# Patient Record
Sex: FEMALE | Race: WHITE | Employment: PART TIME | URBAN - METROPOLITAN AREA
[De-identification: names, ages, dates, MRNs, and addresses within clinical notes are randomized per-mention and may not be internally consistent; named-entity substitution may affect disease eponyms.]

---

## 2017-02-20 PROCEDURE — G0202 SCR MAMMO BI INCL CAD: HCPCS | Mod: TC | Performed by: RADIOLOGY

## 2017-02-20 PROCEDURE — 77063 BREAST TOMOSYNTHESIS BI: CPT | Mod: TC | Performed by: RADIOLOGY

## 2017-05-03 DIAGNOSIS — N91.2 ABSENCE OF MENSTRUATION: Primary | ICD-10-CM

## 2017-05-05 NOTE — TELEPHONE ENCOUNTER
romana      Last Written Prescription Date: 5/12/15  Last Fill Quantity: 60g,  # refills: 1   Last Office Visit with [facility], [facility] or [facility] prescribing provider: 10/14/16

## 2017-08-09 DIAGNOSIS — N91.2 ABSENCE OF MENSTRUATION: ICD-10-CM

## 2017-08-10 DIAGNOSIS — N91.2 ABSENCE OF MENSTRUATION: ICD-10-CM

## 2017-08-11 NOTE — TELEPHONE ENCOUNTER
COMPOUNDED NON-CONTROLLED SUBSTANCE (CMPD RX) - PHARMACY TO MIX COMPOUNDED MEDICATION   Last Written Prescription Date:  8/9/17  Last Fill Quantity: 90 lozenge,   # refills: 0  Last Office Visit with [facility] or [facility] prescribing provider: 10/14/16  Future Office visit:       Routing refill request to provider for review/approval because:  Drug not on the [facility], [facility] or [facility] refill protocol or controlled substance

## 2018-01-31 DIAGNOSIS — N91.2 ABSENCE OF MENSTRUATION: ICD-10-CM

## 2018-01-31 NOTE — TELEPHONE ENCOUNTER
Visit overdue  Needs appt by end of February - also needs mammogram  Rx signed    Dary PARNELL-[facility]  [phone]

## 2018-01-31 NOTE — TELEPHONE ENCOUNTER
COMPOUNDED NON-CONTROLLED SUBSTANCE (CMPD RX) - PHARMACY TO MIX COMPOUNDED MEDICATION     Last Written Prescription Date:  8/11/17  Last Fill Quantity: 90,   # refills: 0  Last Office Visit: 10/14/16  Future Office visit:

## 2018-02-23 ENCOUNTER — RADIANT APPOINTMENT (OUTPATIENT)
Dept: GENERAL RADIOLOGY | Facility: OTHER | Age: 53
End: 2018-02-23
Attending: NURSE PRACTITIONER
Payer: COMMERCIAL

## 2018-02-23 ENCOUNTER — OFFICE VISIT (OUTPATIENT)
Dept: FAMILY MEDICINE | Facility: OTHER | Age: 53
End: 2018-02-23
Attending: NURSE PRACTITIONER
Payer: COMMERCIAL

## 2018-02-23 VITALS
TEMPERATURE: 99.1 F | WEIGHT: 162 LBS | DIASTOLIC BLOOD PRESSURE: 80 MMHG | HEIGHT: 67 IN | BODY MASS INDEX: 25.43 KG/M2 | HEART RATE: 96 BPM | SYSTOLIC BLOOD PRESSURE: 130 MMHG | OXYGEN SATURATION: 98 % | RESPIRATION RATE: 16 BRPM

## 2018-02-23 DIAGNOSIS — R53.83 FATIGUE, UNSPECIFIED TYPE: ICD-10-CM

## 2018-02-23 DIAGNOSIS — J01.00 ACUTE NON-RECURRENT MAXILLARY SINUSITIS: ICD-10-CM

## 2018-02-23 DIAGNOSIS — J20.9 ACUTE BRONCHITIS, UNSPECIFIED ORGANISM: ICD-10-CM

## 2018-02-23 DIAGNOSIS — R50.9 FEVER, UNSPECIFIED FEVER CAUSE: ICD-10-CM

## 2018-02-23 DIAGNOSIS — Z12.31 ENCOUNTER FOR SCREENING MAMMOGRAM FOR BREAST CANCER: ICD-10-CM

## 2018-02-23 DIAGNOSIS — E78.5 HYPERLIPIDEMIA LDL GOAL <100: Primary | ICD-10-CM

## 2018-02-23 DIAGNOSIS — Z12.11 SCREENING FOR COLON CANCER: ICD-10-CM

## 2018-02-23 DIAGNOSIS — G43.009 MIGRAINE WITHOUT AURA AND WITHOUT STATUS MIGRAINOSUS, NOT INTRACTABLE: ICD-10-CM

## 2018-02-23 LAB
ANION GAP SERPL CALCULATED.3IONS-SCNC: 9 MMOL/L (ref 3–14)
BASOPHILS # BLD AUTO: 0 10E9/L (ref 0–0.2)
BASOPHILS NFR BLD AUTO: 0.7 %
BUN SERPL-MCNC: 10 MG/DL (ref 7–30)
CALCIUM SERPL-MCNC: 9.2 MG/DL (ref 8.5–10.1)
CHLORIDE SERPL-SCNC: 100 MMOL/L (ref 94–109)
CHOLEST SERPL-MCNC: 209 MG/DL
CO2 SERPL-SCNC: 27 MMOL/L (ref 20–32)
CREAT SERPL-MCNC: 0.94 MG/DL (ref 0.52–1.04)
DIFFERENTIAL METHOD BLD: NORMAL
EOSINOPHIL # BLD AUTO: 0.3 10E9/L (ref 0–0.7)
EOSINOPHIL NFR BLD AUTO: 4.5 %
ERYTHROCYTE [DISTWIDTH] IN BLOOD BY AUTOMATED COUNT: 12.9 % (ref 10–15)
FLUAV+FLUBV AG SPEC QL: NEGATIVE
FLUAV+FLUBV AG SPEC QL: NEGATIVE
GFR SERPL CREATININE-BSD FRML MDRD: 63 ML/MIN/1.7M2
GLUCOSE SERPL-MCNC: 92 MG/DL (ref 70–99)
HCT VFR BLD AUTO: 42.9 % (ref 35–47)
HDLC SERPL-MCNC: 60 MG/DL
HGB BLD-MCNC: 14.7 G/DL (ref 11.7–15.7)
LDLC SERPL CALC-MCNC: 125 MG/DL
LYMPHOCYTES # BLD AUTO: 1.6 10E9/L (ref 0.8–5.3)
LYMPHOCYTES NFR BLD AUTO: 27.9 %
MCH RBC QN AUTO: 30.2 PG (ref 26.5–33)
MCHC RBC AUTO-ENTMCNC: 34.3 G/DL (ref 31.5–36.5)
MCV RBC AUTO: 88 FL (ref 78–100)
MONOCYTES # BLD AUTO: 0.8 10E9/L (ref 0–1.3)
MONOCYTES NFR BLD AUTO: 14.2 %
NEUTROPHILS # BLD AUTO: 3.1 10E9/L (ref 1.6–8.3)
NEUTROPHILS NFR BLD AUTO: 52.7 %
NONHDLC SERPL-MCNC: 149 MG/DL
PLATELET # BLD AUTO: 308 10E9/L (ref 150–450)
POTASSIUM SERPL-SCNC: 3.8 MMOL/L (ref 3.4–5.3)
RBC # BLD AUTO: 4.87 10E12/L (ref 3.8–5.2)
SODIUM SERPL-SCNC: 136 MMOL/L (ref 133–144)
SPECIMEN SOURCE: NORMAL
TRIGL SERPL-MCNC: 120 MG/DL
TSH SERPL DL<=0.005 MIU/L-ACNC: 2.38 MU/L (ref 0.4–4)
WBC # BLD AUTO: 5.8 10E9/L (ref 4–11)

## 2018-02-23 PROCEDURE — 84443 ASSAY THYROID STIM HORMONE: CPT | Performed by: NURSE PRACTITIONER

## 2018-02-23 PROCEDURE — 82306 VITAMIN D 25 HYDROXY: CPT | Mod: 90 | Performed by: NURSE PRACTITIONER

## 2018-02-23 PROCEDURE — 99214 OFFICE O/P EST MOD 30 MIN: CPT | Performed by: NURSE PRACTITIONER

## 2018-02-23 PROCEDURE — 71046 X-RAY EXAM CHEST 2 VIEWS: CPT | Mod: TC

## 2018-02-23 PROCEDURE — 87804 INFLUENZA ASSAY W/OPTIC: CPT | Mod: 59 | Performed by: NURSE PRACTITIONER

## 2018-02-23 PROCEDURE — 85025 COMPLETE CBC W/AUTO DIFF WBC: CPT | Performed by: NURSE PRACTITIONER

## 2018-02-23 PROCEDURE — 99000 SPECIMEN HANDLING OFFICE-LAB: CPT | Performed by: NURSE PRACTITIONER

## 2018-02-23 PROCEDURE — 80048 BASIC METABOLIC PNL TOTAL CA: CPT | Performed by: NURSE PRACTITIONER

## 2018-02-23 PROCEDURE — 80061 LIPID PANEL: CPT | Performed by: NURSE PRACTITIONER

## 2018-02-23 PROCEDURE — 36415 COLL VENOUS BLD VENIPUNCTURE: CPT | Performed by: NURSE PRACTITIONER

## 2018-02-23 RX ORDER — AZITHROMYCIN 250 MG/1
TABLET, FILM COATED ORAL
Qty: 11 TABLET | Refills: 0 | Status: SHIPPED | OUTPATIENT
Start: 2018-02-23 | End: 2019-02-19

## 2018-02-23 RX ORDER — RIZATRIPTAN BENZOATE 5 MG/1
5-10 TABLET ORAL
Qty: 18 TABLET | Refills: 3 | Status: SHIPPED | OUTPATIENT
Start: 2018-02-23 | End: 2019-02-20

## 2018-02-23 ASSESSMENT — PAIN SCALES - GENERAL: PAINLEVEL: NO PAIN (0)

## 2018-02-23 NOTE — PATIENT INSTRUCTIONS
1. Fever, unspecified fever cause  - Influenza A/B antigen    2. Acute bronchitis, unspecified organism  - Influenza A/B antigen  - XR CHEST 2 VW (Clinic Performed); Future  - azithromycin (ZITHROMAX) 250 MG tablet; Two tablets first day, then one tablet daily for 9 days.  Dispense: 11 tablet; Refill: 0    3. Acute non-recurrent maxillary sinusitis  - azithromycin (ZITHROMAX) 250 MG tablet; Two tablets first day, then one tablet daily for 9 days.  Dispense: 11 tablet; Refill: 0      Fluids  Rest  Humidity at home - add bacteriostatic solution to humidifier  OTC Mucinex liquid cough and cold  Add Zicam or other zinc daily until you feel better  Please go to the ER or UC if your symptoms worsen   Please return to clinic if unimproved  Ibuprofen for temp / body aches      4. Fatigue, unspecified type  - Vitamin D Deficiency  - CBC with platelets differential  - Cholecalciferol (VITAMIN D-3) 5000 UNITS TABS; Take 1 capsule by mouth daily  Dispense: 90 tablet; Refill: 11    5. Hyperlipidemia LDL goal <100  - Fish oil 3 per day  - Lipid Profile  - Basic metabolic panel  - TSH with free T4 reflex    6. Migraine without aura and without status migrainosus, not intractable  - magnesium 200 mg daily  - rizatriptan (MAXALT) 5 MG tablet; Take 1-2 tablets (5-10 mg) by mouth at onset of headache for migraine May repeat in 2 hours. Max 6 tablets/24 hours.  Dispense: 18 tablet; Refill: 3    7. Encounter for screening mammogram for breast cancer  - MA Screen Bilateral w/Venancio; Future    8. Screening for colon cancer  - GENERAL SURG ADULT REFERRAL      Baby aspirin daily  Multiple vitamin daily        Dary Goddard NP  [facility]

## 2018-02-23 NOTE — NURSING NOTE
"Chief Complaint   Patient presents with     Hyperlipidemia       Initial /86 (BP Location: Right arm, Patient Position: Sitting, Cuff Size: Adult Regular)  Pulse 96  Temp 99.1  F (37.3  C) (Tympanic)  Resp 16  Ht 5' 7\" (1.702 m)  Wt 162 lb (73.5 kg)  SpO2 98%  BMI 25.37 kg/m2 Estimated body mass index is 25.37 kg/(m^2) as calculated from the following:    Height as of this encounter: 5' 7\" (1.702 m).    Weight as of this encounter: 162 lb (73.5 kg).  Medication Reconciliation: complete   Blaire Dixon MA  "

## 2018-02-23 NOTE — PROGRESS NOTES
SUBJECTIVE:                                                    Kristina Michael is a 52 year old female who presents to clinic today for the following health issues:        Hyperlipidemia Follow-Up    Rate your low fat/cholesterol diet?: fair    Taking statin?  No    Other lipid medications/supplements?:  None        BP Readings from Last 2 Encounters:   02/23/18 130/80   10/14/16 128/88     LDL Cholesterol Calculated (mg/dL)   Date Value   10/14/2016 147 (H)         Migraine Follow-Up    Headaches symptoms:  Stable     Frequency: variable     Duration of headaches: variable    Able to do normal daily activities/work with migraines: Yes    Rescue/Relief medication:Maxalt              Effectiveness: total relief    Preventative medication: None    Neurologic complications: No new stroke-like symptoms, loss of vision or speech, numbness or weakness    In the past 4 weeks, how often have you gone to Urgent Care or the emergency room because of your headaches?  0        RESPIRATORY SYMPTOMS    Duration: Since Monday    Description  nasal congestion, sore throat, facial pain/pressure, cough, fever, chills, ear pain bilateral, headache and fatigue/malaise    Severity: mild    Accompanying signs and symptoms: None    History (predisposing factors):  none    Precipitating or alleviating factors: None    Therapies tried and outcome:  rest and fluids oral decongestant acetaminophen            Amount of exercise or physical activity: 4-5 days/week for an average of 30-45 minutes    Problems taking medications regularly: No    Medication side effects: none    Diet: low salt        Problem list and histories reviewed & adjusted, as indicated.  Additional history: as documented    Patient Active Problem List   Diagnosis     Primary insomnia     Surgical menopause     ACP (advance care planning)     H/O cold sores     Hyperlipidemia LDL goal <100     Migraine without aura and without status migrainosus, not intractable     Past  Surgical History:   Procedure Laterality Date     BREAST SURGERY      breast reduction     GYN SURGERY  2000    total hysterectomy     GYN SURGERY      hysterscopy     GYN SURGERY      endometerial tiissue removed x's 2     GYN SURGERY      D&C     HC TOOTH EXTRACTION W/FORCEP         Social History   Substance Use Topics     Smoking status: Never Smoker     Smokeless tobacco: Never Used     Alcohol use Yes     Family History   Problem Relation Age of Onset     Cancer - colorectal Paternal Grandfather      Alzheimer Disease Paternal Grandfather          Current Outpatient Prescriptions   Medication Sig Dispense Refill     COMPOUNDED NON-CONTROLLED SUBSTANCE (CMPD RX) - PHARMACY TO MIX COMPOUNDED MEDICATION DISSOLVE 1 SABAS BETWEEN CHEEK AND GUM EVERY NIGHT AT BEDTIME 90 lozenge 0     LORazepam (ATIVAN) 1 MG tablet TAKE 1 TABLET BY MOUTH TWICE DAILY AS NEEDED FOR ANXIETY. TAKE 1 TABLET BY MOUTH AT BEDTIME 60 tablet 0     UNABLE TO FIND MEDICATION NAME: Bi-Est(80/20) Prog/Test 0.75/40/0.25mg Sabas   1 every HS 90 each 3     valACYclovir (VALTREX) 500 MG tablet Take 1 tablet (500 mg) by mouth 2 times daily 30 tablet 3     multivitamin, therapeutic (THERA-VIT) TABS Take 1 tablet by mouth daily       desonide (DESOWEN) 0.05 % cream Apply topically as needed Apply to eye lids and ears prn (Patient not taking: Reported on 2/23/2018) 60 g 1     Allergies   Allergen Reactions     Penicillins Hives     Recent Labs   Lab Test  10/14/16   0922   LDL  147*   HDL  69   TRIG  119   CR  0.87   GFRESTIMATED  70   GFRESTBLACK  84   POTASSIUM  4.0   TSH  1.97      BP Readings from Last 3 Encounters:   02/23/18 130/80   10/14/16 128/88   04/04/16 136/82    Wt Readings from Last 3 Encounters:   02/23/18 162 lb (73.5 kg)   10/14/16 166 lb (75.3 kg)   04/04/16 161 lb (73 kg)               Labs reviewed in EPIC    ROS:  Constitutional, HEENT, cardiovascular, pulmonary, gi and gu systems are negative, except as otherwise  "noted.    OBJECTIVE:     /80 (BP Location: Right arm, Patient Position: Sitting, Cuff Size: Adult Regular)  Pulse 96  Temp 99.1  F (37.3  C) (Tympanic)  Resp 16  Ht 5' 7\" (1.702 m)  Wt 162 lb (73.5 kg)  SpO2 98%  BMI 25.37 kg/m2  Body mass index is 25.37 kg/(m^2).       GENERAL: healthy, alert and no distress  EYES: Eyes grossly normal to inspection, PERRL and conjunctivae and sclerae normal  HENT: both ears: erythematous and sinuses: maxillary, frontal tenderness on both sides - thick yellow PND noted  NECK: no adenopathy, no asymmetry, masses, or scars and thyroid normal to palpation  RESP: deep loose cough  CV: regular rate and rhythm, normal S1 S2, no S3 or S4, no murmur, click or rub, no peripheral edema and peripheral pulses strong  ABDOMEN: soft, nontender, no hepatosplenomegaly, no masses and bowel sounds normal  MS: no gross musculoskeletal defects noted, no edema  SKIN: no suspicious lesions or rashes  PSYCH: mentation appears normal, affect normal/bright        PROCEDURE: XR CHEST 2 VW 2/23/2018 9:47 AM    HISTORY: bronchitis, deep cough; Acute bronchitis, unspecified  organism    COMPARISONS: None.    TECHNIQUE: 2 views.    FINDINGS: Heart and pulmonary vasculature are normal. Lungs are clear  and no pleural effusion is seen. There is mild degenerative change  thoracic spine.                Impression             IMPRESSION: No acute disease.    BETY HU MD                     ASSESSMENT/PLAN:       Migraine: controlled   Plan:  No changes in the patient's current treatment plan  RN migraine education      1. Fever, unspecified fever cause  - Influenza A/B antigen    2. Acute bronchitis, unspecified organism  - Influenza A/B antigen  - XR CHEST 2 VW (Clinic Performed); Future  - azithromycin (ZITHROMAX) 250 MG tablet; Two tablets first day, then one tablet daily for 9 days.  Dispense: 11 tablet; Refill: 0    3. Acute non-recurrent maxillary sinusitis  - azithromycin (ZITHROMAX) 250 " MG tablet; Two tablets first day, then one tablet daily for 9 days.  Dispense: 11 tablet; Refill: 0      Fluids  Rest  Humidity at home - add bacteriostatic solution to humidifier  OTC Mucinex liquid cough and cold  Add Zicam or other zinc daily until you feel better  Please go to the ER or UC if your symptoms worsen   Please return to clinic if unimproved  Ibuprofen for temp / body aches      4. Fatigue, unspecified type  - Vitamin D Deficiency  - CBC with platelets differential  - Cholecalciferol (VITAMIN D-3) 5000 UNITS TABS; Take 1 capsule by mouth daily  Dispense: 90 tablet; Refill: 11    5. Hyperlipidemia LDL goal <100  - Fish oil 3 per day  - Lipid Profile  - Basic metabolic panel  - TSH with free T4 reflex    6. Migraine without aura and without status migrainosus, not intractable  - magnesium 200 mg daily  - rizatriptan (MAXALT) 5 MG tablet; Take 1-2 tablets (5-10 mg) by mouth at onset of headache for migraine May repeat in 2 hours. Max 6 tablets/24 hours.  Dispense: 18 tablet; Refill: 3    7. Encounter for screening mammogram for breast cancer  - MA Screen Bilateral w/Venancio; Future    8. Screening for colon cancer  - GENERAL SURG ADULT REFERRAL      Baby aspirin daily  Multiple vitamin daily        Dary Goddard NP  [facility]

## 2018-02-23 NOTE — LETTER
My Migraine Action Plan      Date: 2/23/2018     My Name: Kristina Michael   YOB: 1965  My Pharmacy:    [facility] - [place], [place]  [facility] [id number] [place] [address] [address] [place]  [address] [facility] [address]  [facility] [place] - [address] [facility] [place]. [facility] [place] [facility] [place]       My (Preventative) Control Medicine: None        My Rescue Medicine: Maxalt   My Doctor: Dary Goddard     My Clinic: [facility]  [address]  [facility] [address]  [phone]        GREEN ZONE = Good Control    My headache plan is working.   I can do what I need to do.           I WILL:     ? Keep managing my triggers.  ? Write in my migraine diary each time I have a headache.  ? Keep taking my preventive (controller) medicine daily.  ? Take my relief and rescue medicine as needed.             YELLOW ZONE = Not Enough Control    My headache plan isn t always working.   My headaches keep me from doing   some of the things I need to do.       I WILL:     ? Set goals to control my triggers and act on them.  ? Write in my migraine diary each time I have a headache and review it for                      patterns or new triggers.  ? Keep taking my preventive (controller) medicine daily.  ? Take my relief and rescue medicine as needed.  ? Call my doctor or clinic at if I stay in the Yellow Zone.             RED ZONE = Poor or No Control    My headache plan has  failed. I can t do anything  when I have one. My  medicines aren t working.           I WILL:   ? Set goals to control my triggers and act on them.  ? Write in my migraine diary each time I have a headache and review it for                      patterns or new triggers.  ? Keep taking my preventive (controller) medicine daily.  ? Take my relief and rescue medicine as needed.  ? Call my doctor or clinic or go to urgent care or an ER if I m having the worst                  headache  of my life.  ? Call my doctor or clinic or go to urgent care or an ER if my medicine doesn t work.  ? Let my doctor or clinic know within 2 weeks if I have gone to an urgent care or             emergency department.          Provider specific instructions:  No

## 2018-02-23 NOTE — MR AVS SNAPSHOT
After Visit Summary   2/23/2018    Kristina Michael    MRN: [id number]           Patient Information     Date Of Birth          1965        Visit Information        Provider Department      2/23/2018 9:00 AM Dary Goddard NP [facility]        Today's Diagnoses     Hyperlipidemia LDL goal <100    -  1    Fever, unspecified fever cause        Acute bronchitis, unspecified organism        Acute non-recurrent maxillary sinusitis        Fatigue, unspecified type        Migraine without aura and without status migrainosus, not intractable        Encounter for screening mammogram for breast cancer        Screening for colon cancer          Care Instructions      1. Fever, unspecified fever cause  - Influenza A/B antigen    2. Acute bronchitis, unspecified organism  - Influenza A/B antigen  - XR CHEST 2 VW (Clinic Performed); Future  - azithromycin (ZITHROMAX) 250 MG tablet; Two tablets first day, then one tablet daily for 9 days.  Dispense: 11 tablet; Refill: 0    3. Acute non-recurrent maxillary sinusitis  - azithromycin (ZITHROMAX) 250 MG tablet; Two tablets first day, then one tablet daily for 9 days.  Dispense: 11 tablet; Refill: 0      Fluids  Rest  Humidity at home - add bacteriostatic solution to humidifier  OTC Mucinex liquid cough and cold  Add Zicam or other zinc daily until you feel better  Please go to the ER or UC if your symptoms worsen   Please return to clinic if unimproved  Ibuprofen for temp / body aches      4. Fatigue, unspecified type  - Vitamin D Deficiency  - CBC with platelets differential  - Cholecalciferol (VITAMIN D-3) 5000 UNITS TABS; Take 1 capsule by mouth daily  Dispense: 90 tablet; Refill: 11    5. Hyperlipidemia LDL goal <100  - Fish oil 3 per day  - Lipid Profile  - Basic metabolic panel  - TSH with free T4 reflex    6. Migraine without aura and without status migrainosus, not intractable  - magnesium 200 mg daily  - rizatriptan (MAXALT) 5 MG tablet; Take 1-2  tablets (5-10 mg) by mouth at onset of headache for migraine May repeat in 2 hours. Max 6 tablets/24 hours.  Dispense: 18 tablet; Refill: 3    7. Encounter for screening mammogram for breast cancer  - MA Screen Bilateral w/Venancio; Future    8. Screening for colon cancer  - GENERAL SURG ADULT REFERRAL      Baby aspirin daily  Multiple vitamin daily        Dary Goddard NP  [facility]              Follow-ups after your visit        Additional Services     GENERAL SURG ADULT REFERRAL       Your provider has referred you to: General  Surgery  - colon consult - FH positive    Please be aware that coverage of these services is subject to the terms and limitations of your health insurance plan.  Call member services at your health plan with any benefit or coverage questions.      Please bring the following with you to your appointment:    (1) Any X-Rays, CTs or MRIs which have been performed.  Contact the facility where they were done to arrange for  prior to your scheduled appointment.   (2) List of current medications   (3) This referral request   (4) Any documents/labs given to you for this referral                  Future tests that were ordered for you today     Open Future Orders        Priority Expected Expires Ordered    MA Screen Bilateral w/Venancio Routine  2/23/2019 2/23/2018            Who to contact     If you have questions or need follow up information about today's clinic visit or your schedule please contact [facility] directly at [phone].  Normal or non-critical lab and imaging results will be communicated to you by MyChart, letter or phone within 4 business days after the clinic has received the results. If you do not hear from us within 7 days, please contact the clinic through MyChart or phone. If you have a critical or abnormal lab result, we will notify you by phone as soon as possible.  Submit refill requests through [employer] or call your pharmacy and they will  "forward the refill request to us. Please allow 3 business days for your refill to be completed.          Additional Information About Your Visit        [employer] Information     [employer] gives you secure access to your electronic health record. If you see a primary care provider, you can also send messages to your care team and make appointments. If you have questions, please call your primary care clinic.  If you do not have a primary care provider, please call [phone] and they will assist you.        Care EveryWhere ID     This is your Care EveryWhere ID. This could be used by other organizations to access your [facility] medical records  [id number]        Your Vitals Were     Pulse Temperature Respirations Height Pulse Oximetry BMI (Body Mass Index)    96 99.1  F (37.3  C) (Tympanic) 16 5' 7\" (1.702 m) 98% 25.37 kg/m2       Blood Pressure from Last 3 Encounters:   02/23/18 130/80   10/14/16 128/88   04/04/16 136/82    Weight from Last 3 Encounters:   02/23/18 162 lb (73.5 kg)   10/14/16 166 lb (75.3 kg)   04/04/16 161 lb (73 kg)              We Performed the Following     Basic metabolic panel     CBC with platelets differential     GENERAL SURG ADULT REFERRAL     Influenza A/B antigen     Lipid Profile     TSH with free T4 reflex     Vitamin D Deficiency          Today's Medication Changes          These changes are accurate as of 2/23/18  9:54 AM.  If you have any questions, ask your nurse or doctor.               Start taking these medicines.        Dose/Directions    azithromycin 250 MG tablet   Commonly known as:  ZITHROMAX   Used for:  Acute bronchitis, unspecified organism, Acute non-recurrent maxillary sinusitis   Started by:  Dary Goddard NP        Two tablets first day, then one tablet daily for 9 days.   Quantity:  11 tablet   Refills:  0       rizatriptan 5 MG tablet   Commonly known as:  MAXALT   Used for:  Migraine without aura and without status migrainosus, not intractable   Started by:  Nitza" VIVEK Foote        Dose:  5-10 mg   Take 1-2 tablets (5-10 mg) by mouth at onset of headache for migraine May repeat in 2 hours. Max 6 tablets/24 hours.   Quantity:  18 tablet   Refills:  3       Vitamin D-3 5000 UNITS Tabs   Used for:  Fatigue, unspecified type   Started by:  Dary Goddard NP        Dose:  1 capsule   Take 1 capsule by mouth daily   Quantity:  90 tablet   Refills:  11            Where to get your medicines      These medications were sent to [id number] [place] [address]  AT [facility] [address] [place] [address]     Phone:  [phone]     azithromycin 250 MG tablet    rizatriptan 5 MG tablet         Some of these will need a paper prescription and others can be bought over the counter.  Ask your nurse if you have questions.     You don't need a prescription for these medications     Vitamin D-3 5000 UNITS Tabs                Primary Care Provider Office Phone # Fax #    Dary Goddard [phone] [address] [place]  [facility] [address]        Equal Access to Services     SEBASTIAN CROSS AH: Hadii tiffanie ku hadasho Soomaali, waaxda luqadaha, qaybta kaalmada adedorotayadomenic, alexandro loo. So [facility] [phone].    ATENCIÓN: Si habla español, tiene a lopez disposición servicios gratuitos de asistencia lingüística. [facility] [phone].    We comply with applicable federal civil rights laws and [place] laws. We do not discriminate on the basis of race, color, national origin, age, disability, sex, sexual orientation, or gender identity.            Thank you!     Thank you for choosing [facility]  for your care. Our goal is always to provide you with excellent care. Hearing back from our patients is one way we can continue to improve our services. Please take a few minutes to complete the written survey that you may receive in the mail after your visit with us. Thank you!              Your Updated Medication List - Protect others around you: Learn how to safely use, store and throw away your medicines at www.disposemymeds.org.          This list is accurate as of 2/23/18  9:54 AM.  Always use your most recent med list.                   Brand Name Dispense Instructions for use Diagnosis    azithromycin 250 MG tablet    ZITHROMAX    11 tablet    Two tablets first day, then one tablet daily for 9 days.    Acute bronchitis, unspecified organism, Acute non-recurrent maxillary sinusitis       COMPOUNDED NON-CONTROLLED SUBSTANCE - PHARMACY TO MIX COMPOUNDED MEDICATION    CMPD RX    90 lozenge    DISSOLVE 1 SABAS BETWEEN CHEEK AND GUM EVERY NIGHT AT BEDTIME    Absence of menstruation       desonide 0.05 % cream    DESOWEN    60 g    Apply topically as needed Apply to eye lids and ears prn    Eczema       LORazepam 1 MG tablet    ATIVAN    60 tablet    TAKE 1 TABLET BY MOUTH TWICE DAILY AS NEEDED FOR ANXIETY. TAKE 1 TABLET BY MOUTH AT BEDTIME    Anxiety       multivitamin, therapeutic Tabs tablet      Take 1 tablet by mouth daily        rizatriptan 5 MG tablet    MAXALT    18 tablet    Take 1-2 tablets (5-10 mg) by mouth at onset of headache for migraine May repeat in 2 hours. Max 6 tablets/24 hours.    Migraine without aura and without status migrainosus, not intractable       UNABLE TO FIND     90 each    MEDICATION NAME: Bi-Est(80/20) Prog/Test 0.75/40/0.25mg Sabas   1 every HS    Absence of menstruation       valACYclovir 500 MG tablet    VALTREX    30 tablet    Take 1 tablet (500 mg) by mouth 2 times daily    H/O cold sores       Vitamin D-3 5000 UNITS Tabs     90 tablet    Take 1 capsule by mouth daily    Fatigue, unspecified type

## 2018-02-23 NOTE — PROGRESS NOTES
Labs look good  Lipids are improved  Fish oil 3 per day  D level pending    Dary [facility]LL[facility]ra [facility]  [phone]

## 2018-02-24 LAB — DEPRECATED CALCIDIOL+CALCIFEROL SERPL-MC: 111 UG/L (ref 20–75)

## 2018-02-24 ASSESSMENT — PATIENT HEALTH QUESTIONNAIRE - PHQ9: SUM OF ALL RESPONSES TO PHQ QUESTIONS 1-9: 0

## 2018-02-26 NOTE — PROGRESS NOTES
Hold vitamin D3   Resume D3 5000 U in the fall    Dary PARNELLVA [facility]  [phone]

## 2018-03-12 DIAGNOSIS — F41.9 ANXIETY: ICD-10-CM

## 2018-03-13 RX ORDER — LORAZEPAM 1 MG/1
TABLET ORAL
Qty: 60 TABLET | Refills: 0 | Status: SHIPPED | OUTPATIENT
Start: 2018-03-13 | End: 2018-04-18

## 2018-03-14 ENCOUNTER — RADIANT APPOINTMENT (OUTPATIENT)
Dept: MAMMOGRAPHY | Facility: OTHER | Age: 53
End: 2018-03-14
Attending: NURSE PRACTITIONER
Payer: COMMERCIAL

## 2018-03-14 DIAGNOSIS — Z12.31 ENCOUNTER FOR SCREENING MAMMOGRAM FOR BREAST CANCER: ICD-10-CM

## 2018-03-14 PROCEDURE — 77063 BREAST TOMOSYNTHESIS BI: CPT | Mod: TC

## 2018-03-14 PROCEDURE — 77067 SCR MAMMO BI INCL CAD: CPT | Mod: TC

## 2018-04-18 DIAGNOSIS — F41.9 ANXIETY: ICD-10-CM

## 2018-04-18 RX ORDER — LORAZEPAM 1 MG/1
TABLET ORAL
Qty: 60 TABLET | Refills: 0 | Status: SHIPPED | OUTPATIENT
Start: 2018-04-18 | End: 2018-05-30

## 2018-04-18 NOTE — TELEPHONE ENCOUNTER
Ativan      Last Written Prescription Date:  3/13/18  Last Fill Quantity: 60,   # refills: 0  Last Office Visit: 2/23/18 with Dary Goddard CNP  Future Office visit:       Routing refill request to provider for review/approval because:  Drug not on the [facility], [facility] or [facility] refill protocol or controlled substance

## 2018-05-30 DIAGNOSIS — F41.9 ANXIETY: ICD-10-CM

## 2018-06-01 RX ORDER — LORAZEPAM 1 MG/1
TABLET ORAL
Qty: 60 TABLET | Refills: 0 | Status: SHIPPED | OUTPATIENT
Start: 2018-06-01 | End: 2018-07-26

## 2018-07-26 DIAGNOSIS — F41.9 ANXIETY: ICD-10-CM

## 2018-07-26 RX ORDER — LORAZEPAM 1 MG/1
TABLET ORAL
Qty: 60 TABLET | Refills: 0 | Status: SHIPPED | OUTPATIENT
Start: 2018-07-26 | End: 2019-02-20

## 2018-07-26 NOTE — TELEPHONE ENCOUNTER
lorazepam      Last Written Prescription Date:  6/1/18  Last Fill Quantity: 60,   # refills: 0  Last Office Visit: 2/23/18  Future Office visit:       Routing refill request to provider for review/approval because:  Drug not on the [facility] or [facility] refill protocol or controlled substance

## 2018-08-01 DIAGNOSIS — N91.2 ABSENCE OF MENSTRUATION: ICD-10-CM

## 2018-09-12 DIAGNOSIS — F41.9 ANXIETY: Primary | ICD-10-CM

## 2018-09-13 RX ORDER — LORAZEPAM 0.5 MG/1
TABLET ORAL
Qty: 120 TABLET | Refills: 0 | Status: SHIPPED | OUTPATIENT
Start: 2018-09-13 | End: 2018-10-24

## 2018-10-24 DIAGNOSIS — F41.9 ANXIETY: ICD-10-CM

## 2018-10-24 RX ORDER — LORAZEPAM 0.5 MG/1
TABLET ORAL
Qty: 120 TABLET | Refills: 0 | Status: SHIPPED | OUTPATIENT
Start: 2018-10-24 | End: 2018-12-07

## 2018-12-07 DIAGNOSIS — F41.9 ANXIETY: ICD-10-CM

## 2018-12-07 RX ORDER — LORAZEPAM 0.5 MG/1
TABLET ORAL
Qty: 120 TABLET | Refills: 0 | Status: SHIPPED | OUTPATIENT
Start: 2018-12-07 | End: 2019-01-21

## 2018-12-07 NOTE — TELEPHONE ENCOUNTER
Lorazepam      Last Written Prescription Date:  10/24/18  Last Fill Quantity: 120,   # refills: 0  Last Office Visit: 2/23/18

## 2019-01-21 DIAGNOSIS — F41.9 ANXIETY: ICD-10-CM

## 2019-01-21 RX ORDER — LORAZEPAM 0.5 MG/1
TABLET ORAL
Qty: 120 TABLET | Refills: 0 | Status: SHIPPED | OUTPATIENT
Start: 2019-01-21 | End: 2019-02-20

## 2019-02-19 PROBLEM — F41.9 ANXIETY: Status: ACTIVE | Noted: 2019-02-19

## 2019-02-19 NOTE — PROGRESS NOTES
SUBJECTIVE:   Kristina Michael is a 53 year old female who presents to clinic today for the following health issues:            Hyperlipidemia Follow-Up    Rate your low fat/cholesterol diet?: fair    Taking statin?  No    Other lipid medications/supplements?:  none      Migraine Follow-Up    Headaches symptoms:  Stable     Frequency: 1-2 times per week     Duration of headaches: 3 days without medication    Able to do normal daily activities/work with migraines: Yes    Rescue/Relief medication:Maxalt              Effectiveness: moderate relief    Preventative medication: None    Neurologic complications: No new stroke-like symptoms, loss of vision or speech, numbness or weakness    In the past 4 weeks, how often have you gone to Urgent Care or the emergency room because of your headaches?  0         Anxiety Follow-Up    Status since last visit: stable    Other associated symptoms:None    Complicating factors:   Significant life event: No   Current substance abuse: None  Depression symptoms: No    SARAVANAN-7 SCORE 10/14/2016 2/20/2019   Total Score 4 0       PHQ-9 SCORE 10/14/2016 2/23/2018 2/20/2019   PHQ-9 Total Score - - -   PHQ-9 Total Score 2 0 0          Insomnia  Duration of complaint: Ongoing, stable          Amount of exercise or physical activity: 4-5 days/week for an average of 45-60 minutes    Problems taking medications regularly: No    Medication side effects: none    Diet: low fat/cholesterol        Problem list and histories reviewed & adjusted, as indicated.  Additional history: as documented        Patient Active Problem List   Diagnosis     Primary insomnia     Surgical menopause     ACP (advance care planning)     H/O cold sores     Hyperlipidemia LDL goal <100     Migraine without aura and without status migrainosus, not intractable     Anxiety     Past Surgical History:   Procedure Laterality Date     BREAST SURGERY      breast reduction     GYN SURGERY      hysterscopy     GYN SURGERY       endometerial tiissue removed x's 2     GYN SURGERY      D&C     HC TOOTH EXTRACTION W/FORCEP       HYSTERECTOMY TOTAL ABDOMINAL N/A 01/01/2000    total hysterectomy       Social History     Tobacco Use     Smoking status: Never Smoker     Smokeless tobacco: Never Used   Substance Use Topics     Alcohol use: Yes     Family History   Problem Relation Age of Onset     Cancer - colorectal Paternal Grandfather      Alzheimer Disease Paternal Grandfather            Current Outpatient Medications   Medication Sig Dispense Refill     Cholecalciferol (VITAMIN D-3) 5000 UNITS TABS Take 1 capsule by mouth daily 90 tablet 11     COMPOUND CONTAINING CONTROLLED SUBSTANCE (CMPD RX) - PHARMACY TO MIX COMPOUNDED MEDICATION I every HS 90 each 1     LORazepam (ATIVAN) 0.5 MG tablet TAKE 2 TABLETS BY MOUTH TWICE DAILY AS NEEDED FOR ANXIETY AND 2 TABLETS BY MOUTH AT BEDTIME 120 tablet 0     multivitamin, therapeutic (THERA-VIT) TABS Take 1 tablet by mouth daily       rizatriptan (MAXALT) 5 MG tablet Take 1-2 tablets (5-10 mg) by mouth at onset of headache for migraine May repeat in 2 hours. Max 6 tablets/24 hours. 18 tablet 3     UNABLE TO FIND MEDICATION NAME: Bi-Est(80/20) Prog/Test 0.75/40/0.25mg Sabas   1 every HS 90 each 3     valACYclovir (VALTREX) 500 MG tablet Take 1 tablet (500 mg) by mouth 2 times daily 30 tablet 3     Allergies   Allergen Reactions     Penicillins Hives         Recent Labs   Lab Test 02/23/18  0938 10/14/16  0922   * 147*   HDL 60 69   TRIG 120 119   CR 0.94 0.87   GFRESTIMATED 63 70   GFRESTBLACK 76 84   POTASSIUM 3.8 4.0   TSH 2.38 1.97      BP Readings from Last 3 Encounters:   02/20/19 122/72   02/23/18 130/80   10/14/16 128/88    Wt Readings from Last 3 Encounters:   02/20/19 72.6 kg (160 lb)   02/23/18 73.5 kg (162 lb)   10/14/16 75.3 kg (166 lb)               Labs reviewed in EPIC    Reviewed and updated as needed this visit by clinical staff  Tobacco  Allergies  Meds       Reviewed and  "updated as needed this visit by Provider         ROS:  Constitutional, HEENT, cardiovascular, pulmonary, GI, , musculoskeletal, neuro, skin, endocrine and psych systems are negative, except as otherwise noted.    OBJECTIVE:     /72 (BP Location: Left arm, Patient Position: Sitting, Cuff Size: Adult Regular)   Pulse 72   Temp 98  F (36.7  C) (Tympanic)   Resp 14   Ht 1.702 m (5' 7\")   Wt 72.6 kg (160 lb)   SpO2 96%   BMI 25.06 kg/m    Body mass index is 25.06 kg/m .       GENERAL: healthy, alert and no distress  EYES: Eyes grossly normal to inspection, PERRL and conjunctivae and sclerae normal  HENT: ear canals and TM's normal, nose and mouth without ulcers or lesions  NECK: no adenopathy, no asymmetry, masses, or scars and thyroid normal to palpation  RESP: lungs clear to auscultation - no rales, rhonchi or wheezes  CV: regular rate and rhythm, normal S1 S2, no S3 or S4, no murmur, click or rub, no peripheral edema and peripheral pulses strong  ABDOMEN: soft, nontender, no hepatosplenomegaly, no masses and bowel sounds normal  MS: no gross musculoskeletal defects noted, no edema  SKIN: no suspicious lesions or rashes      Mental Status Assessment:  Constitutional: awake, alert, and no apparent distress  Appearance:   Appropriate   Eye Contact:   Good   Psychomotor Behavior: Normal   Attitude:   Cooperative   Orientation:   All  Speech   Rate / Production: Normal    Volume:  Normal   Mood:    Normal  Affect:    Appropriate   Thought Content:  Clear   Thought Form:  Coherent  Logical   Insight:    Good         ASSESSMENT/PLAN:     1. Hyperlipidemia LDL goal <100  - Lipid Profile  - Comprehensive metabolic panel (BMP + Alb, Alk Phos, ALT, AST, Total. Bili, TP)  - UA reflex to Microscopic and Culture - [facility]    2. Migraine without aura and without status migrainosus, not intractable  - rizatriptan (MAXALT) 5 MG tablet; Take 1-2 tablets (5-10 mg) by mouth at onset of headache for migraine May " repeat in 2 hours. Max 6 tablets/24 hours.  Dispense: 18 tablet; Refill: 6    3. Primary insomnia  - Ativan PRN    4. Anxiety  - TSH with free T4 reflex  - LORazepam (ATIVAN) 0.5 MG tablet; Take 1 tablet (0.5 mg) by mouth every 8 hours as needed for anxiety  Dispense: 120 tablet; Refill: 5    5. Encounter for screening mammogram for breast cancer  - MA Screen Bilateral w/Venancio; Future    6. H/O cold sores  - valACYclovir (VALTREX) 500 MG tablet; Take 1 tablet (500 mg) by mouth 2 times daily  Dispense: 30 tablet; Refill: 3          Dary Goddard NP  [facility]

## 2019-02-20 ENCOUNTER — OFFICE VISIT (OUTPATIENT)
Dept: FAMILY MEDICINE | Facility: OTHER | Age: 54
End: 2019-02-20
Attending: NURSE PRACTITIONER
Payer: COMMERCIAL

## 2019-02-20 VITALS
HEART RATE: 72 BPM | BODY MASS INDEX: 25.11 KG/M2 | SYSTOLIC BLOOD PRESSURE: 122 MMHG | TEMPERATURE: 98 F | RESPIRATION RATE: 14 BRPM | WEIGHT: 160 LBS | HEIGHT: 67 IN | DIASTOLIC BLOOD PRESSURE: 72 MMHG | OXYGEN SATURATION: 96 %

## 2019-02-20 DIAGNOSIS — J06.9 UPPER RESPIRATORY TRACT INFECTION, UNSPECIFIED TYPE: ICD-10-CM

## 2019-02-20 DIAGNOSIS — G43.009 MIGRAINE WITHOUT AURA AND WITHOUT STATUS MIGRAINOSUS, NOT INTRACTABLE: ICD-10-CM

## 2019-02-20 DIAGNOSIS — Z86.19 H/O COLD SORES: ICD-10-CM

## 2019-02-20 DIAGNOSIS — E78.5 HYPERLIPIDEMIA LDL GOAL <100: Primary | ICD-10-CM

## 2019-02-20 DIAGNOSIS — F41.9 ANXIETY: ICD-10-CM

## 2019-02-20 DIAGNOSIS — Z12.31 ENCOUNTER FOR SCREENING MAMMOGRAM FOR BREAST CANCER: ICD-10-CM

## 2019-02-20 DIAGNOSIS — F51.01 PRIMARY INSOMNIA: ICD-10-CM

## 2019-02-20 LAB
ALBUMIN SERPL-MCNC: 4.2 G/DL (ref 3.4–5)
ALBUMIN UR-MCNC: NEGATIVE MG/DL
ALP SERPL-CCNC: 64 U/L (ref 40–150)
ALT SERPL W P-5'-P-CCNC: 26 U/L (ref 0–50)
ANION GAP SERPL CALCULATED.3IONS-SCNC: 7 MMOL/L (ref 3–14)
APPEARANCE UR: CLEAR
AST SERPL W P-5'-P-CCNC: 22 U/L (ref 0–45)
BILIRUB SERPL-MCNC: 0.8 MG/DL (ref 0.2–1.3)
BILIRUB UR QL STRIP: NEGATIVE
BUN SERPL-MCNC: 17 MG/DL (ref 7–30)
CALCIUM SERPL-MCNC: 8.6 MG/DL (ref 8.5–10.1)
CHLORIDE SERPL-SCNC: 105 MMOL/L (ref 94–109)
CHOLEST SERPL-MCNC: 229 MG/DL
CO2 SERPL-SCNC: 27 MMOL/L (ref 20–32)
COLOR UR AUTO: YELLOW
CREAT SERPL-MCNC: 0.93 MG/DL (ref 0.52–1.04)
GFR SERPL CREATININE-BSD FRML MDRD: 71 ML/MIN/{1.73_M2}
GLUCOSE SERPL-MCNC: 83 MG/DL (ref 70–99)
GLUCOSE UR STRIP-MCNC: NEGATIVE MG/DL
HDLC SERPL-MCNC: 73 MG/DL
HGB UR QL STRIP: NEGATIVE
KETONES UR STRIP-MCNC: NEGATIVE MG/DL
LDLC SERPL CALC-MCNC: 134 MG/DL
LEUKOCYTE ESTERASE UR QL STRIP: NEGATIVE
NITRATE UR QL: NEGATIVE
NONHDLC SERPL-MCNC: 156 MG/DL
PH UR STRIP: 8 PH (ref 5–7)
POTASSIUM SERPL-SCNC: 4.1 MMOL/L (ref 3.4–5.3)
PROT SERPL-MCNC: 7.2 G/DL (ref 6.8–8.8)
SODIUM SERPL-SCNC: 139 MMOL/L (ref 133–144)
SOURCE: ABNORMAL
SP GR UR STRIP: 1.01 (ref 1–1.03)
TRIGL SERPL-MCNC: 111 MG/DL
TSH SERPL DL<=0.005 MIU/L-ACNC: 2.8 MU/L (ref 0.4–4)
UROBILINOGEN UR STRIP-ACNC: 0.2 EU/DL (ref 0.2–1)

## 2019-02-20 PROCEDURE — 80053 COMPREHEN METABOLIC PANEL: CPT | Performed by: NURSE PRACTITIONER

## 2019-02-20 PROCEDURE — 84443 ASSAY THYROID STIM HORMONE: CPT | Performed by: NURSE PRACTITIONER

## 2019-02-20 PROCEDURE — 81003 URINALYSIS AUTO W/O SCOPE: CPT | Performed by: NURSE PRACTITIONER

## 2019-02-20 PROCEDURE — 80061 LIPID PANEL: CPT | Performed by: NURSE PRACTITIONER

## 2019-02-20 PROCEDURE — 99214 OFFICE O/P EST MOD 30 MIN: CPT | Performed by: NURSE PRACTITIONER

## 2019-02-20 PROCEDURE — 36415 COLL VENOUS BLD VENIPUNCTURE: CPT | Performed by: NURSE PRACTITIONER

## 2019-02-20 RX ORDER — RIZATRIPTAN BENZOATE 5 MG/1
5-10 TABLET ORAL
Qty: 18 TABLET | Refills: 6 | Status: SHIPPED | OUTPATIENT
Start: 2019-02-20 | End: 2019-09-16

## 2019-02-20 RX ORDER — VALACYCLOVIR HYDROCHLORIDE 500 MG/1
500 TABLET, FILM COATED ORAL 2 TIMES DAILY
Qty: 30 TABLET | Refills: 3 | Status: SHIPPED | OUTPATIENT
Start: 2019-02-20 | End: 2021-03-26

## 2019-02-20 RX ORDER — AZITHROMYCIN 250 MG/1
TABLET, FILM COATED ORAL
Qty: 11 TABLET | Refills: 0 | Status: SHIPPED | OUTPATIENT
Start: 2019-02-20 | End: 2019-03-02

## 2019-02-20 RX ORDER — LORAZEPAM 0.5 MG/1
0.5 TABLET ORAL EVERY 8 HOURS PRN
Qty: 120 TABLET | Refills: 5 | Status: SHIPPED | OUTPATIENT
Start: 2019-02-20 | End: 2019-09-04

## 2019-02-20 ASSESSMENT — PATIENT HEALTH QUESTIONNAIRE - PHQ9
5. POOR APPETITE OR OVEREATING: NOT AT ALL
SUM OF ALL RESPONSES TO PHQ QUESTIONS 1-9: 0

## 2019-02-20 ASSESSMENT — MIFFLIN-ST. JEOR: SCORE: 1373.39

## 2019-02-20 ASSESSMENT — ANXIETY QUESTIONNAIRES
6. BECOMING EASILY ANNOYED OR IRRITABLE: NOT AT ALL
3. WORRYING TOO MUCH ABOUT DIFFERENT THINGS: NOT AT ALL
5. BEING SO RESTLESS THAT IT IS HARD TO SIT STILL: NOT AT ALL
1. FEELING NERVOUS, ANXIOUS, OR ON EDGE: NOT AT ALL
7. FEELING AFRAID AS IF SOMETHING AWFUL MIGHT HAPPEN: NOT AT ALL
2. NOT BEING ABLE TO STOP OR CONTROL WORRYING: NOT AT ALL
GAD7 TOTAL SCORE: 0

## 2019-02-20 NOTE — RESULT ENCOUNTER NOTE
Continued elevated lipids, but with a high HDL, and without other risk factors.    TSH, metabolic profile, and UA are normal    Dary Goddard [facility]  [phone]

## 2019-02-20 NOTE — PATIENT INSTRUCTIONS
ASSESSMENT/PLAN:     1. Hyperlipidemia LDL goal <100  - Lipid Profile  - Comprehensive metabolic panel (BMP + Alb, Alk Phos, ALT, AST, Total. Bili, TP)  - UA reflex to Microscopic and Culture - [facility]    2. Migraine without aura and without status migrainosus, not intractable  - rizatriptan (MAXALT) 5 MG tablet; Take 1-2 tablets (5-10 mg) by mouth at onset of headache for migraine May repeat in 2 hours. Max 6 tablets/24 hours.  Dispense: 18 tablet; Refill: 6    3. Primary insomnia  - Ativan PRN    4. Anxiety  - TSH with free T4 reflex  - LORazepam (ATIVAN) 0.5 MG tablet; Take 1 tablet (0.5 mg) by mouth every 8 hours as needed for anxiety  Dispense: 120 tablet; Refill: 5    5. Encounter for screening mammogram for breast cancer  - MA Screen Bilateral w/Venancio; Future    6. H/O cold sores  - valACYclovir (VALTREX) 500 MG tablet; Take 1 tablet (500 mg) by mouth 2 times daily  Dispense: 30 tablet; Refill: 3        Dary Goddard NP  [facility]

## 2019-02-21 ASSESSMENT — ANXIETY QUESTIONNAIRES: GAD7 TOTAL SCORE: 0

## 2019-03-18 ENCOUNTER — ANCILLARY PROCEDURE (OUTPATIENT)
Dept: MAMMOGRAPHY | Facility: OTHER | Age: 54
End: 2019-03-18
Attending: NURSE PRACTITIONER
Payer: COMMERCIAL

## 2019-03-18 DIAGNOSIS — Z12.31 ENCOUNTER FOR SCREENING MAMMOGRAM FOR BREAST CANCER: ICD-10-CM

## 2019-03-18 PROCEDURE — 77067 SCR MAMMO BI INCL CAD: CPT | Mod: TC

## 2019-03-18 PROCEDURE — 77063 BREAST TOMOSYNTHESIS BI: CPT | Mod: TC

## 2019-07-11 DIAGNOSIS — N91.2 ABSENCE OF MENSTRUATION: ICD-10-CM

## 2019-07-11 NOTE — TELEPHONE ENCOUNTER
biest  Last Written Prescription Date: 10/14/16  Last Fill Quantity: 90 # of Refills: 3  Last Office Visit: 2/20/19

## 2019-07-17 DIAGNOSIS — N91.2 ABSENCE OF MENSTRUATION: ICD-10-CM

## 2019-07-17 NOTE — TELEPHONE ENCOUNTER
Not on protocol, please advise.    COMPOUND CONTAINING CONTROLLED SUBSTANCE (CMPD RX) - PHARMACY TO MIX COMPOUNDED MEDICATION      Last Written Prescription Date:  8/1/18  Last Fill Quantity: 90,   # refills: 1  Last Office Visit: 2/20/19  Future Office visit:       Routing refill request to provider for review/approval because:  Drug not on the [facility], [facility] or [facility] refill protocol or controlled substance

## 2019-07-17 NOTE — TELEPHONE ENCOUNTER
Rx signed, called patient to verify pharmacy. Send to [facility] pharmacy Fax number [phone]. Script faxed.  Ashley A. Lechevalier, LPN on 7/17/2019 at 2:40 PM

## 2019-07-17 NOTE — TELEPHONE ENCOUNTER
LM to return call to the clinic. Need verification of which pharmacy to send Rx. Ashley A. Lechevalier, LPN on 7/17/2019 at 1:12 PM

## 2019-09-04 DIAGNOSIS — F41.9 ANXIETY: ICD-10-CM

## 2019-09-04 RX ORDER — LORAZEPAM 0.5 MG/1
TABLET ORAL
Qty: 120 TABLET | Refills: 0 | Status: SHIPPED | OUTPATIENT
Start: 2019-09-04 | End: 2019-11-06

## 2019-09-04 NOTE — TELEPHONE ENCOUNTER
Lorazepam   Last Written Prescription Date:  2/20/2019  Last Fill Quantity: 120,   # refills: 5  Last Office Visit: 2/20/2019   Future Office visit:       Routing refill request to provider for review/approval because:  Drug not on the [facility], P or [facility] refill protocol or controlled substance

## 2019-10-28 ENCOUNTER — TELEPHONE (OUTPATIENT)
Dept: FAMILY MEDICINE | Facility: OTHER | Age: 54
End: 2019-10-28

## 2019-10-28 DIAGNOSIS — N91.2 ABSENCE OF MENSTRUATION: ICD-10-CM

## 2019-10-28 NOTE — TELEPHONE ENCOUNTER
Please advise.  Patient called and has not been taking the med for 3 days.  Pharmacy said they would overnight it if they get the request today.    COMPOUND CONTAINING CONTROLLED SUBSTANCE (CMPD RX) - PHARMACY TO MIX COMPOUNDED MEDICATION      Last Written Prescription Date:  7/17/19  Last Fill Quantity: 90,   # refills: 0  Last Office Visit: 2/20/19  Future Office visit:       Routing refill request to provider for review/approval because:  Drug not on the [facility], P or [facility] refill protocol or controlled substance

## 2019-10-29 NOTE — TELEPHONE ENCOUNTER
Patient notified. LPN spoke directly to staff at pharmacy to ensure fax was received upon resending again. Ashley LeChevalier LPN

## 2019-10-29 NOTE — TELEPHONE ENCOUNTER
called today stating that pharmacy has not received script.  I'm not able to locate it with the faxes sent by RN yesterday or today.  Can we please re print and resend to pharmacy.  Please advise.

## 2019-11-06 DIAGNOSIS — F41.9 ANXIETY: ICD-10-CM

## 2019-11-06 DIAGNOSIS — F41.9 ANXIETY: Primary | ICD-10-CM

## 2019-11-06 RX ORDER — LORAZEPAM 1 MG/1
TABLET ORAL
Qty: 90 TABLET | Refills: 0 | Status: SHIPPED | OUTPATIENT
Start: 2019-11-06 | End: 2019-12-11

## 2019-11-06 RX ORDER — LORAZEPAM 0.5 MG/1
TABLET ORAL
Qty: 15 TABLET | Refills: 0 | Status: SHIPPED | OUTPATIENT
Start: 2019-11-06 | End: 2019-11-06

## 2020-02-29 DIAGNOSIS — G43.009 MIGRAINE WITHOUT AURA AND WITHOUT STATUS MIGRAINOSUS, NOT INTRACTABLE: ICD-10-CM

## 2020-03-04 RX ORDER — RIZATRIPTAN BENZOATE 5 MG/1
TABLET ORAL
Qty: 18 TABLET | Refills: 0 | Status: SHIPPED | OUTPATIENT
Start: 2020-03-04 | End: 2020-03-31

## 2020-03-04 NOTE — TELEPHONE ENCOUNTER
maxalt      Last Written Prescription Date:  1/30/20  Last Fill Quantity: 18,   # refills: 0  Last Office Visit: 2/20/19  Future Office visit:

## 2020-03-04 NOTE — TELEPHONE ENCOUNTER
Protocol failed due to    Blood pressure under 140/90 in past 12 months    Serotonin Agonist request needs review.    Recent (12 mo) or future (30 days) visit within the authorizing provider's specialty     LOV 2/20/19. Please advise. Thank you!    BP Readings from Last 3 Encounters:   02/20/19 122/72   02/23/18 130/80   10/14/16 128/88

## 2020-03-31 ENCOUNTER — TELEPHONE (OUTPATIENT)
Dept: FAMILY MEDICINE | Facility: OTHER | Age: 55
End: 2020-03-31

## 2020-03-31 ENCOUNTER — VIRTUAL VISIT (OUTPATIENT)
Dept: FAMILY MEDICINE | Facility: OTHER | Age: 55
End: 2020-03-31
Attending: NURSE PRACTITIONER
Payer: COMMERCIAL

## 2020-03-31 VITALS — WEIGHT: 160 LBS | BODY MASS INDEX: 25.06 KG/M2

## 2020-03-31 DIAGNOSIS — F41.9 ANXIETY: Primary | ICD-10-CM

## 2020-03-31 DIAGNOSIS — G43.009 MIGRAINE WITHOUT AURA AND WITHOUT STATUS MIGRAINOSUS, NOT INTRACTABLE: ICD-10-CM

## 2020-03-31 PROCEDURE — 99213 OFFICE O/P EST LOW 20 MIN: CPT | Mod: TEL | Performed by: NURSE PRACTITIONER

## 2020-03-31 RX ORDER — RIZATRIPTAN BENZOATE 10 MG/1
10 TABLET ORAL
Qty: 18 TABLET | Refills: 5 | Status: SHIPPED | OUTPATIENT
Start: 2020-03-31 | End: 2021-01-22

## 2020-03-31 RX ORDER — RIZATRIPTAN BENZOATE 5 MG/1
TABLET ORAL
Qty: 18 TABLET | Refills: 0 | Status: CANCELLED | OUTPATIENT
Start: 2020-03-31

## 2020-03-31 ASSESSMENT — ANXIETY QUESTIONNAIRES
7. FEELING AFRAID AS IF SOMETHING AWFUL MIGHT HAPPEN: SEVERAL DAYS
GAD7 TOTAL SCORE: 2
6. BECOMING EASILY ANNOYED OR IRRITABLE: NOT AT ALL
IF YOU CHECKED OFF ANY PROBLEMS ON THIS QUESTIONNAIRE, HOW DIFFICULT HAVE THESE PROBLEMS MADE IT FOR YOU TO DO YOUR WORK, TAKE CARE OF THINGS AT HOME, OR GET ALONG WITH OTHER PEOPLE: NOT DIFFICULT AT ALL
4. TROUBLE RELAXING: NOT AT ALL
5. BEING SO RESTLESS THAT IT IS HARD TO SIT STILL: NOT AT ALL
1. FEELING NERVOUS, ANXIOUS, OR ON EDGE: SEVERAL DAYS
3. WORRYING TOO MUCH ABOUT DIFFERENT THINGS: NOT AT ALL
2. NOT BEING ABLE TO STOP OR CONTROL WORRYING: NOT AT ALL

## 2020-03-31 ASSESSMENT — PATIENT HEALTH QUESTIONNAIRE - PHQ9: SUM OF ALL RESPONSES TO PHQ QUESTIONS 1-9: 0

## 2020-03-31 ASSESSMENT — PAIN SCALES - GENERAL: PAINLEVEL: NO PAIN (0)

## 2020-03-31 NOTE — PROGRESS NOTES
"  Kristina Michael is a 54 year old female who is being evaluated via a billable telephone visit.      The patient has been notified of following:     \"This telephone visit will be conducted via a call between you and your physician/provider. We have found that certain health care needs can be provided without the need for a physical exam.  This service lets us provide the care you need with a short phone conversation.  If a prescription is necessary we can send it directly to your pharmacy.  If lab work is needed we can place an order for that and you can then stop by our lab to have the test done at a later time.    If during the course of the call the physician/provider feels a telephone visit is not appropriate, you will not be charged for this service.\"     Patient has given verbal consent for Telephone visit?  Yes    Kristina Michael complains of   Chief Complaint   Patient presents with     Headache     Anxiety         ALLERGIES  Penicillins      Anxiety Follow-Up    How are you doing with your anxiety since your last visit? No change    Are you having other symptoms that might be associated with anxiety? No    Have you had a significant life event? No     Are you feeling depressed? No    Do you have any concerns with your use of alcohol or other drugs? No      Social History     Tobacco Use     Smoking status: Never Smoker     Smokeless tobacco: Never Used   Substance Use Topics     Alcohol use: Yes     Drug use: No         SARAVANAN-7 SCORE 10/14/2016 2/20/2019 3/31/2020   Total Score 4 0 2         PHQ 2/23/2018 2/20/2019 3/31/2020   PHQ-9 Total Score 0 0 0   Q9: Thoughts of better off dead/self-harm past 2 weeks Not at all Not at all Not at all         Migraine     Since your last clinic visit, how have your headaches changed?  No change    How often are you getting headaches or migraines? 1-2 times a week     Are you able to do normal daily activities when you have a migraine? Yes    Are you taking rescue/relief " medications? (Select all that apply) Maxalt    How helpful is your rescue/relief medication?  I get total relief    Are you taking any medications to prevent migraines? (Select all that apply)  No    In the past 4 weeks, how often have you gone to urgent care or the emergency room because of your headaches?  0        How many servings of fruits and vegetables do you eat daily?  2-3    On average, how many sweetened beverages do you drink each day (Examples: soda, juice, sweet tea, etc.  Do NOT count diet or artificially sweetened beverages)?   0    How many days per week do you exercise enough to make your heart beat faster? 4    How many minutes a day do you exercise enough to make your heart beat faster? 30 - 60    How many days per week do you miss taking your medication? 0           Patient Active Problem List   Diagnosis     Primary insomnia     Surgical menopause     ACP (advance care planning)     H/O cold sores     Hyperlipidemia LDL goal <100     Migraine without aura and without status migrainosus, not intractable     Anxiety     Past Surgical History:   Procedure Laterality Date     BREAST SURGERY      breast reduction     GYN SURGERY      hysterscopy     GYN SURGERY      endometerial tiissue removed x's 2     GYN SURGERY      D&C     HC TOOTH EXTRACTION W/FORCEP       HYSTERECTOMY TOTAL ABDOMINAL N/A 01/01/2000    total hysterectomy       Social History     Tobacco Use     Smoking status: Never Smoker     Smokeless tobacco: Never Used   Substance Use Topics     Alcohol use: Yes     Family History   Problem Relation Age of Onset     Cancer - colorectal Paternal Grandfather      Alzheimer Disease Paternal Grandfather              Current Outpatient Medications   Medication Sig Dispense Refill     Cholecalciferol (VITAMIN D-3) 5000 UNITS TABS Take 1 capsule by mouth daily 90 tablet 11     COMPOUND CONTAINING CONTROLLED SUBSTANCE (CMPD RX) - PHARMACY TO MIX COMPOUNDED MEDICATION I every HS 90 each 0      LORazepam (ATIVAN) 1 MG tablet TAKE 1/2 TO 1 TABLET BY MOUTH EVERY 8 HOURS AS NEEDED 90 tablet 5     multivitamin, therapeutic (THERA-VIT) TABS Take 1 tablet by mouth daily       rizatriptan (MAXALT) 5 MG tablet TAKE 1 TO 2 TABLETS(5 TO 10 MG) BY MOUTH AT ONSET OF HEADACHE FOR MIGRAINE. MAY REPEAT IN 2 HOURS. MAX 6 TABLETS/ 24 HOURS 18 tablet 0     valACYclovir (VALTREX) 500 MG tablet Take 1 tablet (500 mg) by mouth 2 times daily 30 tablet 3     UNABLE TO FIND MEDICATION NAME: Bi-Est(80/20) Prog/Test 0.75/40/0.25mg Sabas   1 every HS (Patient not taking: Reported on 3/31/2020) 90 each 3         Allergies   Allergen Reactions     Penicillins Hives         Recent Labs   Lab Test 02/20/19  0825 02/23/18  0938 10/14/16  0922   * 125* 147*   HDL 73 60 69   TRIG 111 120 119   ALT 26  --   --    CR 0.93 0.94 0.87   GFRESTIMATED 71 63 70   GFRESTBLACK 82 76 84   POTASSIUM 4.1 3.8 4.0   TSH 2.80 2.38 1.97          BP Readings from Last 3 Encounters:   02/20/19 122/72   02/23/18 130/80   10/14/16 128/88    Wt Readings from Last 3 Encounters:   03/31/20 72.6 kg (160 lb)   02/20/19 72.6 kg (160 lb)   02/23/18 73.5 kg (162 lb)                 Reviewed and updated as needed this visit by Provider         Review of Systems   ROS COMP: Constitutional, HEENT, cardiovascular, pulmonary, GI, , musculoskeletal, neuro, skin, endocrine and psych systems are negative, except as otherwise noted.       Objective   Reported vitals: Denies fever at home          Psych: Alert and oriented times 3; coherent speech, normal   rate and volume, able to articulate logical thoughts, able   to abstract reason, no tangential thoughts, no hallucinations   or delusions  Her affect is appro        Assessment/Plan:    1. Migraine without aura and without status migrainosus, not intractable  - rizatriptan (MAXALT) 10 MG tablet; Take 1 tablet (10 mg) by mouth at onset of headache for migraine Use good Rx if insurance is restricted.  Dispense: 18  tablet; Refill: 5      2. Anxiety  - Continue plan of care        Cologuard form completed  Mammogram when COVID restrictions lift      Phone call duration:  20  minutes      Dary VIGIL  [phone]

## 2020-03-31 NOTE — PATIENT INSTRUCTIONS
Assessment/Plan:    1. Migraine without aura and without status migrainosus, not intractable  - rizatriptan (MAXALT) 10 MG tablet; Take 1 tablet (10 mg) by mouth at onset of headache for migraine Use good Rx if insurance is restricted.  Dispense: 18 tablet; Refill: 5      2. Anxiety  - Continue plan of care      Cologuard form completed      Phone call duration:  20  minutes      Dary PARNELLJACKY  [phone]

## 2020-04-01 ASSESSMENT — ANXIETY QUESTIONNAIRES: GAD7 TOTAL SCORE: 2

## 2020-06-08 ENCOUNTER — TRANSFERRED RECORDS (OUTPATIENT)
Dept: HEALTH INFORMATION MANAGEMENT | Facility: HOSPITAL | Age: 55
End: 2020-06-08

## 2020-06-08 LAB — COLOGUARD-ABSTRACT: NEGATIVE

## 2020-06-12 DIAGNOSIS — F41.9 ANXIETY: ICD-10-CM

## 2020-06-12 RX ORDER — LORAZEPAM 1 MG/1
TABLET ORAL
Qty: 90 TABLET | Refills: 0 | Status: SHIPPED | OUTPATIENT
Start: 2020-06-12 | End: 2020-07-29

## 2020-06-12 NOTE — TELEPHONE ENCOUNTER
Ativan      Last Written Prescription Date:  12/11/19  Last Fill Quantity: 90,   # refills: 5  Last Office Visit: 03/31/20  Future Office visit:       Routing refill request to provider for review/approval because:

## 2020-07-01 DIAGNOSIS — N91.2 ABSENCE OF MENSTRUATION: ICD-10-CM

## 2020-07-01 NOTE — TELEPHONE ENCOUNTER
Compounded liudmila      Last Written Prescription Date:  10/29/19  Last Fill Quantity: 90,   # refills: 0  Last Office Visit: 3/31/20 VIRTUAL  Future Office visit:       Routing refill request to provider for review/approval because:  Drug not on the [facility], [facility] or [facility] refill protocol or controlled substance

## 2020-07-29 DIAGNOSIS — F41.9 ANXIETY: ICD-10-CM

## 2020-07-29 RX ORDER — LORAZEPAM 1 MG/1
TABLET ORAL
Qty: 90 TABLET | Refills: 1 | Status: SHIPPED | OUTPATIENT
Start: 2020-07-29 | End: 2020-11-27

## 2020-07-29 NOTE — TELEPHONE ENCOUNTER
lorazepam      Last Written Prescription Date:  6/12/20  Last Fill Quantity: 90,   # refills: 0  Last Office Visit: 3/31/20  Future Office visit:       Routing refill request to provider for review/approval because:  Drug not on the [facility], [facility] or [facility] refill protocol or controlled substance

## 2020-09-17 ENCOUNTER — TELEPHONE (OUTPATIENT)
Dept: FAMILY MEDICINE | Facility: OTHER | Age: 55
End: 2020-09-17

## 2020-09-17 DIAGNOSIS — N91.2 ABSENCE OF MENSTRUATION: ICD-10-CM

## 2020-09-17 NOTE — TELEPHONE ENCOUNTER
Compound       Last Written Prescription Date:  7/2/20  Last Fill Quantity: 90,   # refills: 0  Last Office Visit: 3/31/20  Future Office visit:       Routing refill request to provider for review/approval because:  Drug not on the [facility], [facility] or [facility] refill protocol or controlled substance

## 2020-09-17 NOTE — TELEPHONE ENCOUNTER
Please verify where to send this compound Rx - and fax it.    It is Signed.    Thank you!    Dary VIGIL  [phone]

## 2020-11-27 DIAGNOSIS — F41.9 ANXIETY: ICD-10-CM

## 2020-11-27 RX ORDER — LORAZEPAM 1 MG/1
TABLET ORAL
Qty: 90 TABLET | Refills: 0 | Status: SHIPPED | OUTPATIENT
Start: 2020-11-27 | End: 2021-01-22

## 2020-11-27 NOTE — TELEPHONE ENCOUNTER
Ativan 1mg      Last Written Prescription Date:  7/29/20  Last Fill Quantity: 90,   # refills: 1  Last Office Visit: 3/31/20 Virtual visit  Future Office visit:       Routing refill request to provider for review/approval because:

## 2020-12-14 ENCOUNTER — TELEPHONE (OUTPATIENT)
Dept: FAMILY MEDICINE | Facility: OTHER | Age: 55
End: 2020-12-14

## 2020-12-14 DIAGNOSIS — N91.2 ABSENCE OF MENSTRUATION: ICD-10-CM

## 2020-12-14 NOTE — TELEPHONE ENCOUNTER
Please notify medication was sent to the pharmacy. Left message to notify patient she is due for a mammo apt.   ALVARO COOK LPN

## 2020-12-14 NOTE — TELEPHONE ENCOUNTER
compound      Last Written Prescription Date:  9/17/20  Last Fill Quantity: 90,   # refills: 0  Last Office Visit: 3/31/20  Future Office visit:       Routing refill request to provider for review/approval because:  Drug not on the [facility], [facility] or [facility] refill protocol or controlled substance

## 2021-01-22 DIAGNOSIS — F41.9 ANXIETY: ICD-10-CM

## 2021-01-22 DIAGNOSIS — G43.009 MIGRAINE WITHOUT AURA AND WITHOUT STATUS MIGRAINOSUS, NOT INTRACTABLE: ICD-10-CM

## 2021-01-22 RX ORDER — LORAZEPAM 1 MG/1
TABLET ORAL
Qty: 90 TABLET | Refills: 0 | Status: SHIPPED | OUTPATIENT
Start: 2021-01-22 | End: 2021-03-12

## 2021-01-22 RX ORDER — RIZATRIPTAN BENZOATE 10 MG/1
TABLET ORAL
Qty: 18 TABLET | Refills: 5 | Status: SHIPPED | OUTPATIENT
Start: 2021-01-22 | End: 2021-09-03

## 2021-01-22 NOTE — TELEPHONE ENCOUNTER
LORazepam (ATIVAN) 1 MG tablet   Last Written Prescription Date:  11/27/20  Last Fill Quantity: 90,   # refills: 0  Last Office Visit: 3/31/20  Future Office visit:       rizatriptan (MAXALT) 10 MG tablet     Last Written Prescription Date:  3/31/20  Last Fill Quantity: 18,   # refills: 5  Last Office Visit: 3/31/20  Future Office visit:       Routing refill request to provider for review/approval

## 2021-01-22 NOTE — TELEPHONE ENCOUNTER
Meds are filled, but visit is due - pls schedule    [facility]  [phone]

## 2021-03-09 NOTE — PROGRESS NOTES
SUBJECTIVE:   CC: Kristina Michael is an 55 year old woman who presents for preventive health visit.   Patient has been advised of split billing requirements and indicates understanding: Yes       Healthy Habits:    Do you get at least three servings of calcium containing foods daily (dairy, green leafy vegetables, etc.)? yes    Amount of exercise or daily activities, outside of work: 3 day(s) per week    Problems taking medications regularly No    Medication side effects: No    Have you had an eye exam in the past two years? yes    Do you see a dentist twice per year? yes    Do you have sleep apnea, excessive snoring or daytime drowsiness?no      Hyperlipidemia Follow-Up    Are you regularly taking any medication or supplement to lower your cholesterol?   No    Are you having muscle aches or other side effects that you think could be caused by your cholesterol lowering medication?  No      Anxiety Follow-Up    How are you doing with your anxiety since your last visit? No change    Are you having other symptoms that might be associated with anxiety? No    Have you had a significant life event? No     Are you feeling depressed? No    Do you have any concerns with your use of alcohol or other drugs? No      Social History     Tobacco Use     Smoking status: Never Smoker     Smokeless tobacco: Never Used   Substance Use Topics     Alcohol use: Yes     Drug use: No       SARAVANAN-7 SCORE 2/20/2019 3/31/2020 3/26/2021   Total Score 0 2 0       PHQ 2/20/2019 3/31/2020 3/26/2021   PHQ-9 Total Score 0 0 0   Q9: Thoughts of better off dead/self-harm past 2 weeks Not at all Not at all Not at all       Last PHQ-9 3/26/2021   1.  Little interest or pleasure in doing things 0   2.  Feeling down, depressed, or hopeless 0   3.  Trouble falling or staying asleep, or sleeping too much 0   4.  Feeling tired or having little energy 0   5.  Poor appetite or overeating 0   6.  Feeling bad about yourself 0   7.  Trouble concentrating 0    8.  Moving slowly or restless 0   Q9: Thoughts of better off dead/self-harm past 2 weeks 0   PHQ-9 Total Score 0   Difficulty at work, home, or with people Not difficult at all       SARAVANAN-7  3/26/2021   1. Feeling nervous, anxious, or on edge 0   2. Not being able to stop or control worrying 0   3. Worrying too much about different things 0   4. Trouble relaxing 0   5. Being so restless that it is hard to sit still 0   6. Becoming easily annoyed or irritable 0   7. Feeling afraid, as if something awful might happen 0   SARAVANAN-7 Total Score 0   If you checked any problems, how difficult have they made it for you to do your work, take care of things at home, or get along with other people? Not difficult at all       Migraine     Since your last clinic visit, how have your headaches changed?  Worsened    How often are you getting headaches or migraines? 2 per week     Are you able to do normal daily activities when you have a migraine? Yes    Are you taking rescue/relief medications? (Select all that apply) Maxalt    How helpful is your rescue/relief medication?  I get total relief    Are you taking any medications to prevent migraines? (Select all that apply)  No    In the past 4 weeks, how often have you gone to urgent care or the emergency room because of your headaches?  0      Abuse: Current or Past(Physical, Sexual or Emotional)- No  Do you feel safe in your environment? No        Social History     Tobacco Use     Smoking status: Never Smoker     Smokeless tobacco: Never Used   Substance Use Topics     Alcohol use: Yes     If you drink alcohol do you typically have >3 drinks per day or >7 drinks per week? No                     Reviewed orders with patient.  Reviewed health maintenance and updated orders accordingly - Yes  Lab work is in process  Labs reviewed in EPIC  BP Readings from Last 3 Encounters:   03/26/21 124/72   02/20/19 122/72   02/23/18 130/80    Wt Readings from Last 3 Encounters:   03/26/21 72.6 kg  (160 lb)   03/31/20 72.6 kg (160 lb)   02/20/19 72.6 kg (160 lb)                Patient Active Problem List   Diagnosis     Primary insomnia     Surgical menopause     ACP (advance care planning)     H/O cold sores     Hyperlipidemia LDL goal <100     Migraine without aura and without status migrainosus, not intractable     Anxiety     Past Surgical History:   Procedure Laterality Date     DILATION AND CURETTAGE      x 2     GYN SURGERY      endometerial tiissue removed x's 2     HC TOOTH EXTRACTION W/FORCEP       HYSTERECTOMY TOTAL ABDOMINAL N/A 01/01/2000     HYSTEROSCOPY       MAMMOPLASTY REDUCTION BILATERAL      breast reduction       Social History     Tobacco Use     Smoking status: Never Smoker     Smokeless tobacco: Never Used   Substance Use Topics     Alcohol use: Yes     Family History   Problem Relation Age of Onset     Cancer - colorectal Paternal Grandfather      Alzheimer Disease Paternal Grandfather            Current Outpatient Medications   Medication Sig Dispense Refill     COMPOUND CONTAINING CONTROLLED SUBSTANCE (CMPD RX) - PHARMACY TO MIX COMPOUNDED MEDICATION Dissolve 1 liudmila between cheek and gum once daily at bedtime 90 each 0     LORazepam (ATIVAN) 1 MG tablet TAKE 1/2 TO 1 TABLET BY MOUTH EVERY 8 HOURS AS NEEDED 90 tablet 0     multivitamin, therapeutic (THERA-VIT) TABS Take 1 tablet by mouth daily       rizatriptan (MAXALT) 10 MG tablet TAKE 1 TABLET BY MOUTH AT ONSET OF HEADACHE FOR MIGRAINE. 18 tablet 5     valACYclovir (VALTREX) 500 MG tablet Take 1 tablet (500 mg) by mouth 2 times daily 30 tablet 3       Allergies   Allergen Reactions     Penicillins Hives       Recent Labs   Lab Test 02/20/19  0825 02/23/18  0938 10/14/16  0922   * 125* 147*   HDL 73 60 69   TRIG 111 120 119   ALT 26  --   --    CR 0.93 0.94 0.87   GFRESTIMATED 71 63 70   GFRESTBLACK 82 76 84   POTASSIUM 4.1 3.8 4.0   TSH 2.80 2.38 1.97        FSH-7: No flowsheet data found.  click delete button to remove  "this line now      Mammogram is scheduled for April      History of abnormal Pap smear: Status post hysterectomy.      Reviewed and updated as needed this visit by clinical staff  Tobacco  Allergies  Meds   Med Hx  Surg Hx  Fam Hx  Soc Hx        Reviewed and updated as needed this visit by Provider                Past Medical History:   Diagnosis Date     Anxiety 2/19/2019     H/O cold sores 10/14/2016     Headache(784.0) 5/12/2015     Hyperlipidemia LDL goal <100 10/14/2016     Insomnia 5/12/2015     Migraine without aura and without status migrainosus, not intractable 2/23/2018     Primary insomnia 5/12/2015     Surgical menopause 5/12/2015        Past Surgical History:   Procedure Laterality Date     DILATION AND CURETTAGE      x 2     GYN SURGERY      endometerial tiissue removed x's 2     HC TOOTH EXTRACTION W/FORCEP       HYSTERECTOMY TOTAL ABDOMINAL N/A 01/01/2000     HYSTEROSCOPY       MAMMOPLASTY REDUCTION BILATERAL      breast reduction       OB History   No obstetric history on file.       ROS:  CONSTITUTIONAL: NEGATIVE for fever, chills, change in weight  INTEGUMENTARY/SKIN: NEGATIVE for worrisome rashes, moles or lesions  EYES: NEGATIVE for vision changes or irritation  ENT: NEGATIVE for ear, mouth and throat problems  RESP: NEGATIVE for significant cough or SOB  BREAST: NEGATIVE for masses, tenderness or discharge  CV: NEGATIVE for chest pain, palpitations or peripheral edema  GI: NEGATIVE for nausea, abdominal pain, heartburn, or change in bowel habits  : NEGATIVE for unusual urinary or vaginal symptoms. No vaginal bleeding.  MUSCULOSKELETAL: NEGATIVE for significant arthralgias or myalgia  NEURO: NEGATIVE for weakness, dizziness or paresthesias  PSYCHIATRIC: NEGATIVE for changes in mood or affect     OBJECTIVE:   /72   Pulse 81   Temp 97.9  F (36.6  C) (Tympanic)   Resp 16   Ht 1.702 m (5' 7\")   Wt 72.6 kg (160 lb)   SpO2 97%   BMI 25.06 kg/m         EXAM:  GENERAL: healthy, " "alert and no distress  EYES: Eyes grossly normal to inspection, PERRL and conjunctivae and sclerae normal  HENT: ear canals and TM's normal, nose and mouth without ulcers or lesions  NECK: no adenopathy, no asymmetry, masses, or scars and thyroid normal to palpation  RESP: lungs clear to auscultation - no rales, rhonchi or wheezes  CV: regular rate and rhythm, normal S1 S2, no S3 or S4, no murmur, click or rub, no peripheral edema and peripheral pulses strong  MS: no gross musculoskeletal defects noted, no edema  SKIN: no suspicious lesions or rashes  PSYCH: mentation appears normal, affect normal/bright        ASSESSMENT/PLAN:     1. Routine general medical examination at a health care facility  - Lipid Profile (Chol, Trig, HDL, LDL calc)  - TSH with free T4 reflex  - CBC with platelets differential  - Vitamin D Deficiency  - Comprehensive metabolic panel    2. Migraine without aura and without status migrainosus, not intractable  - CBC with platelets differential  - Maxalt PRN    3. Hyperlipidemia LDL goal <100  - Lipid Profile (Chol, Trig, HDL, LDL calc)  - TSH with free T4 reflex    4. Anxiety  - LORazepam (ATIVAN) 1 MG tablet; TAKE 1/2 TO 1 TABLET BY MOUTH EVERY 8 HOURS AS NEEDED  Dispense: 90 tablet; Refill: 1    5. Primary insomnia  - Follow-up as needed        Patient has been advised of split billing requirements and indicates understanding: Yes       COUNSELING:   Reviewed preventive health counseling, as reflected in patient instructions       Regular exercise       Healthy diet/nutrition       Vision screening        Estimated body mass index is 25.06 kg/m  as calculated from the following:    Height as of this encounter: 1.702 m (5' 7\").    Weight as of this encounter: 72.6 kg (160 lb).        She reports that she has never smoked. She has never used smokeless tobacco.      Counseling Resources:  ATP IV Guidelines  Pooled Cohorts Equation Calculator  Breast Cancer Risk Calculator  BRCA-Related Cancer " Risk Assessment: FHS-7 Tool  FRAX Risk Assessment  ICSI Preventive Guidelines  Dietary Guidelines for Americans, 2010  USDA's MyPlate  ASA Prophylaxis  Lung CA Screening      Dary Goddard, RUSSEL  [facility]

## 2021-03-11 DIAGNOSIS — N91.2 ABSENCE OF MENSTRUATION: ICD-10-CM

## 2021-03-11 NOTE — TELEPHONE ENCOUNTER
coumpound      Last Written Prescription Date:  12/14/20  Last Fill Quantity: 90,   # refills: 0  Last Office Visit: 3/31/20  Future Office visit:    Next 5 appointments (look out 90 days)    Mar 26, 2021 10:00 AM  (Arrive by 9:45 AM)  PHYSICAL with Dary Goddard CNP  [facility] ([facility] ) [address]  [facility] [address]  [phone]           Routing refill request to provider for review/approval because:  Drug not on the [facility], [facility] or [facility] refill protocol or controlled substance

## 2021-03-12 DIAGNOSIS — F41.9 ANXIETY: ICD-10-CM

## 2021-03-12 RX ORDER — LORAZEPAM 1 MG/1
TABLET ORAL
Qty: 90 TABLET | Refills: 0 | Status: SHIPPED | OUTPATIENT
Start: 2021-03-12 | End: 2021-03-26

## 2021-03-26 ENCOUNTER — OFFICE VISIT (OUTPATIENT)
Dept: FAMILY MEDICINE | Facility: OTHER | Age: 56
End: 2021-03-26
Attending: NURSE PRACTITIONER
Payer: COMMERCIAL

## 2021-03-26 VITALS
BODY MASS INDEX: 25.11 KG/M2 | HEIGHT: 67 IN | RESPIRATION RATE: 16 BRPM | DIASTOLIC BLOOD PRESSURE: 72 MMHG | HEART RATE: 81 BPM | WEIGHT: 160 LBS | OXYGEN SATURATION: 97 % | TEMPERATURE: 97.9 F | SYSTOLIC BLOOD PRESSURE: 124 MMHG

## 2021-03-26 DIAGNOSIS — Z00.00 ROUTINE GENERAL MEDICAL EXAMINATION AT A HEALTH CARE FACILITY: Primary | ICD-10-CM

## 2021-03-26 DIAGNOSIS — Z86.19 H/O COLD SORES: ICD-10-CM

## 2021-03-26 DIAGNOSIS — F41.9 ANXIETY: ICD-10-CM

## 2021-03-26 DIAGNOSIS — E78.5 HYPERLIPIDEMIA LDL GOAL <100: ICD-10-CM

## 2021-03-26 DIAGNOSIS — G43.009 MIGRAINE WITHOUT AURA AND WITHOUT STATUS MIGRAINOSUS, NOT INTRACTABLE: ICD-10-CM

## 2021-03-26 DIAGNOSIS — F51.01 PRIMARY INSOMNIA: ICD-10-CM

## 2021-03-26 LAB
ALBUMIN SERPL-MCNC: 4 G/DL (ref 3.4–5)
ALP SERPL-CCNC: 68 U/L (ref 40–150)
ALT SERPL W P-5'-P-CCNC: 63 U/L (ref 0–50)
ANION GAP SERPL CALCULATED.3IONS-SCNC: 9 MMOL/L (ref 3–14)
AST SERPL W P-5'-P-CCNC: 35 U/L (ref 0–45)
BASOPHILS # BLD AUTO: 0 10E9/L (ref 0–0.2)
BASOPHILS NFR BLD AUTO: 0.6 %
BILIRUB SERPL-MCNC: 0.9 MG/DL (ref 0.2–1.3)
BUN SERPL-MCNC: 15 MG/DL (ref 7–30)
CALCIUM SERPL-MCNC: 8.9 MG/DL (ref 8.5–10.1)
CHLORIDE SERPL-SCNC: 103 MMOL/L (ref 94–109)
CHOLEST SERPL-MCNC: 241 MG/DL
CO2 SERPL-SCNC: 25 MMOL/L (ref 20–32)
CREAT SERPL-MCNC: 0.96 MG/DL (ref 0.52–1.04)
DIFFERENTIAL METHOD BLD: NORMAL
EOSINOPHIL # BLD AUTO: 0.2 10E9/L (ref 0–0.7)
EOSINOPHIL NFR BLD AUTO: 3.3 %
ERYTHROCYTE [DISTWIDTH] IN BLOOD BY AUTOMATED COUNT: 12.6 % (ref 10–15)
GFR SERPL CREATININE-BSD FRML MDRD: 68 ML/MIN/{1.73_M2}
GLUCOSE SERPL-MCNC: 91 MG/DL (ref 70–99)
HCT VFR BLD AUTO: 44.2 % (ref 35–47)
HDLC SERPL-MCNC: 64 MG/DL
HGB BLD-MCNC: 15 G/DL (ref 11.7–15.7)
LDLC SERPL CALC-MCNC: 151 MG/DL
LYMPHOCYTES # BLD AUTO: 2.8 10E9/L (ref 0.8–5.3)
LYMPHOCYTES NFR BLD AUTO: 44.3 %
MCH RBC QN AUTO: 29.9 PG (ref 26.5–33)
MCHC RBC AUTO-ENTMCNC: 33.9 G/DL (ref 31.5–36.5)
MCV RBC AUTO: 88 FL (ref 78–100)
MONOCYTES # BLD AUTO: 0.7 10E9/L (ref 0–1.3)
MONOCYTES NFR BLD AUTO: 11.7 %
NEUTROPHILS # BLD AUTO: 2.5 10E9/L (ref 1.6–8.3)
NEUTROPHILS NFR BLD AUTO: 40.1 %
NONHDLC SERPL-MCNC: 177 MG/DL
PLATELET # BLD AUTO: 360 10E9/L (ref 150–450)
POTASSIUM SERPL-SCNC: 3.8 MMOL/L (ref 3.4–5.3)
PROT SERPL-MCNC: 7.5 G/DL (ref 6.8–8.8)
RBC # BLD AUTO: 5.01 10E12/L (ref 3.8–5.2)
SODIUM SERPL-SCNC: 137 MMOL/L (ref 133–144)
TRIGL SERPL-MCNC: 130 MG/DL
TSH SERPL DL<=0.005 MIU/L-ACNC: 2.09 MU/L (ref 0.4–4)
WBC # BLD AUTO: 6.3 10E9/L (ref 4–11)

## 2021-03-26 PROCEDURE — 36415 COLL VENOUS BLD VENIPUNCTURE: CPT | Performed by: NURSE PRACTITIONER

## 2021-03-26 PROCEDURE — 80050 GENERAL HEALTH PANEL: CPT | Performed by: NURSE PRACTITIONER

## 2021-03-26 PROCEDURE — 80061 LIPID PANEL: CPT | Performed by: NURSE PRACTITIONER

## 2021-03-26 PROCEDURE — 99396 PREV VISIT EST AGE 40-64: CPT | Performed by: NURSE PRACTITIONER

## 2021-03-26 PROCEDURE — 82306 VITAMIN D 25 HYDROXY: CPT | Performed by: NURSE PRACTITIONER

## 2021-03-26 RX ORDER — VALACYCLOVIR HYDROCHLORIDE 500 MG/1
500 TABLET, FILM COATED ORAL 2 TIMES DAILY
Qty: 30 TABLET | Refills: 3 | Status: SHIPPED | OUTPATIENT
Start: 2021-03-26 | End: 2024-04-22

## 2021-03-26 RX ORDER — LORAZEPAM 1 MG/1
TABLET ORAL
Qty: 90 TABLET | Refills: 1 | Status: SHIPPED | OUTPATIENT
Start: 2021-03-26 | End: 2021-06-22

## 2021-03-26 ASSESSMENT — PAIN SCALES - GENERAL: PAINLEVEL: NO PAIN (0)

## 2021-03-26 ASSESSMENT — MIFFLIN-ST. JEOR: SCORE: 1363.39

## 2021-03-26 ASSESSMENT — ANXIETY QUESTIONNAIRES
IF YOU CHECKED OFF ANY PROBLEMS ON THIS QUESTIONNAIRE, HOW DIFFICULT HAVE THESE PROBLEMS MADE IT FOR YOU TO DO YOUR WORK, TAKE CARE OF THINGS AT HOME, OR GET ALONG WITH OTHER PEOPLE: NOT DIFFICULT AT ALL
3. WORRYING TOO MUCH ABOUT DIFFERENT THINGS: NOT AT ALL
1. FEELING NERVOUS, ANXIOUS, OR ON EDGE: NOT AT ALL
2. NOT BEING ABLE TO STOP OR CONTROL WORRYING: NOT AT ALL
4. TROUBLE RELAXING: NOT AT ALL
GAD7 TOTAL SCORE: 0
7. FEELING AFRAID AS IF SOMETHING AWFUL MIGHT HAPPEN: NOT AT ALL
5. BEING SO RESTLESS THAT IT IS HARD TO SIT STILL: NOT AT ALL
6. BECOMING EASILY ANNOYED OR IRRITABLE: NOT AT ALL

## 2021-03-26 ASSESSMENT — PATIENT HEALTH QUESTIONNAIRE - PHQ9: SUM OF ALL RESPONSES TO PHQ QUESTIONS 1-9: 0

## 2021-03-26 NOTE — PATIENT INSTRUCTIONS
ASSESSMENT/PLAN:     1. Routine general medical examination at a health care facility  - Lipid Profile (Chol, Trig, HDL, LDL calc)  - TSH with free T4 reflex  - CBC with platelets differential  - Vitamin D Deficiency  - Comprehensive metabolic panel    2. Migraine without aura and without status migrainosus, not intractable  - CBC with platelets differential  - Maxalt PRN    3. Hyperlipidemia LDL goal <100  - Lipid Profile (Chol, Trig, HDL, LDL calc)  - TSH with free T4 reflex    4. Anxiety  - LORazepam (ATIVAN) 1 MG tablet; TAKE 1/2 TO 1 TABLET BY MOUTH EVERY 8 HOURS AS NEEDED  Dispense: 90 tablet; Refill: 1    5. Primary insomnia  - Follow-up as needed        Preventive Health Recommendations  Female Ages 50 - 64    Yearly exam: See your health care provider every year in order to  o Review health changes.   o Discuss preventive care.    o Review your medicines if your doctor has prescribed any.      Get a Pap test every three years (unless you have an abnormal result and your provider advises testing more often).    If you get Pap tests with HPV test, you only need to test every 5 years, unless you have an abnormal result.     You do not need a Pap test if your uterus was removed (hysterectomy) and you have not had cancer.    You should be tested each year for STDs (sexually transmitted diseases) if you're at risk.     Have a mammogram every 1 to 2 years.    Have a colonoscopy at age 50, or have a yearly FIT test (stool test). These exams screen for colon cancer.      Have a cholesterol test every 5 years, or more often if advised.    Have a diabetes test (fasting glucose) every three years. If you are at risk for diabetes, you should have this test more often.     If you are at risk for osteoporosis (brittle bone disease), think about having a bone density scan (DEXA).    Shots: Get a flu shot each year. Get a tetanus shot every 10 years.    Nutrition:     Eat at least 5 servings of fruits and vegetables each  day.    Eat whole-grain bread, whole-wheat pasta and brown rice instead of white grains and rice.    Get adequate Calcium and Vitamin D.     Lifestyle    Exercise at least 150 minutes a week (30 minutes a day, 5 days a week). This will help you control your weight and prevent disease.    Limit alcohol to one drink per day.    No smoking.     Wear sunscreen to prevent skin cancer.     See your dentist every six months for an exam and cleaning.    See your eye doctor every 1 to 2 years.

## 2021-03-26 NOTE — NURSING NOTE
"Chief Complaint   Patient presents with     Physical       Initial /72   Pulse 81   Temp 97.9  F (36.6  C) (Tympanic)   Resp 16   Ht 1.702 m (5' 7\")   Wt 72.6 kg (160 lb)   SpO2 97%   BMI 25.06 kg/m   Estimated body mass index is 25.06 kg/m  as calculated from the following:    Height as of this encounter: 1.702 m (5' 7\").    Weight as of this encounter: 72.6 kg (160 lb).  Medication Reconciliation: complete  Mary Abdul LPN  "

## 2021-03-26 NOTE — LETTER
My Migraine Action Plan      Date: 3/26/2021     My Name: Kristina Michael   YOB: 1965  My Pharmacy:    [id number] - [place], [place] - [address]  [address] [facility] [address]  [facility] [place] - [address] [address] [facility]. [facility] [place] [facility] [place]     My Preventive Medicine(s):  None  My Rescue Medicine(s):  Rizatriptan (Maxalt) as directed My Doctor: Dary Goddard     My Clinic: [facility]  [address]  [facility] [address]  [phone]        GREEN ZONE = Good Control    My headache plan is working.   I can do what I need to do.         I WILL:     ? Keep managing my triggers.  ? Write in my migraine diary each time I have a headache.  ? Keep taking my preventive medicine daily.  ? Take my rescue medicine as needed.             YELLOW ZONE = Not Enough Control    My headache plan isn t always working.   My headaches keep me from doing   some of the things I need to do.   I WILL:     ? Set goals to control my triggers and act on them.  ? Write in my migraine diary each time I have a headache and review it for                     patterns or new triggers.  ? Keep taking my preventive medicine daily.  ? Take my rescue medicine as needed.  l Make sure I stay hydrated.  ? Call my provider or clinic if my rescue medication did not help after taking it on             at least two separate headache days  ? Call my provider or clinic if I need to use my rescue medicine more than an                  average of 2 times per week over the course of one month.               RED ZONE = Poor or No Control    My headache plan has  failed. I can t do anything  when I have one. My  medicines aren t working.   I WILL:   ? Keep taking my preventive medicine daily.  ? Make sure I stay hydrated.  ? Call my provider or clinic if my medicines are not helping or if I am not able to               keep fluids down because of nausea.  ? Let my provider or clinic know within 2 weeks if I have gone to an urgent care or          emergency department.          Provider specific instructions:      Do not take over the counter pain relievers more than 14 days per month. This includes NSAIDS (Ibuprofen, Motrin, Advil, Naproxen, Aleve, etc), acetaminophen (Tylenol), aspirin, and Excedrin.     If you use a triptan medicine (sumatriptan, rizatriptan, frovatriptan, zolmitriptan, eletriptan etc) take it as soon as you notice the migraine starting. You can take a second dose 2 hours after the first dose if you still have any migraine symptoms.    It is fine to take 600 mg of ibuprofen (Advil) or 440 mg of naproxen (Aleve) with the triptan medicine.  Try not to use triptan medicines more than 2 days a week.    If you use your rescue medicine more than 2 times per week over the course of 1 month, set up an appointment with your provider to talk about starting a medication to prevent migraines.               Other Things I Can Do to Help My Migraines   Track Migraines and Triggers     Keep a headache journal of your symptoms. Try to track how often you have a headache, how long it lasts and what medicines you used. You can also track severity of headache.    You can use a smart phone jojo: My Migraine Jimenez. The information that you track in the jojo can be uploaded for your provider through [employer].     You can also track your migraines on paper. The clinic can print a copy of the Migraine Diary for you. Link: [email or web address]      Lifestyle Changes 1. Try to drink enough water each day (48-70 fluid ounces a day)  2. Limit caffeine intact-one caffeinated beverage a day  3. Eat regular meals  4. Try to get cardiovascular exercise (walking, swimming, biking) 4-5 times a week  5. Try to have a routine sleep schedule going to bed at the same time each night and getting up the same time each morning with enough time  to sleep in between  6. Sometimes foods can trigger migraines you can try to eliminate them if you think they make symptoms worse: dairy, gluten, nuts (cashews, almonds), artificial sweeteners, artificial colors, high sugar content foods, certain alcohol, chocolate, and preservatives like MSG and nitrates.      Other Therapies  Talk to your doctor about adding other therapies to your headache treatment plan including:   - Cognitive behavioral therapy  - Biofeedback  - Practice therapeutic techniques at home such as Progressive Relaxation and Deep Breathing    Research suggests that combining one or more of these therapies with medication can be helpful to reduce the number and severity of migraines.     Learn More To learn more about headaches you can go to the following websites:  https://americanmigrainefoundation.org/   http://www.headaches.org/

## 2021-03-27 ASSESSMENT — ANXIETY QUESTIONNAIRES: GAD7 TOTAL SCORE: 0

## 2021-03-27 NOTE — RESULT ENCOUNTER NOTE
Stable labs    The 10-year ASCVD risk score (Katie GRIGGS Jr., et al., 2013) is: 1.6%    Values used to calculate the score:      Age: 55 years      Sex: Female      Is Non- : No      Diabetic: No      Tobacco smoker: No      Systolic Blood Pressure: 124 mmHg      Is BP treated: No      HDL Cholesterol: 64 mg/dL      Total Cholesterol: 241 mg/dL

## 2021-03-28 LAB — DEPRECATED CALCIDIOL+CALCIFEROL SERPL-MC: 55 UG/L (ref 20–75)

## 2021-03-31 ENCOUNTER — TELEPHONE (OUTPATIENT)
Dept: FAMILY MEDICINE | Facility: OTHER | Age: 56
End: 2021-03-31

## 2021-03-31 NOTE — TELEPHONE ENCOUNTER
Received a PA from [employer] for Rizatriptan Benzoate 10mg tablets. Submitted on CMM. Waiting for a response.

## 2021-04-01 NOTE — TELEPHONE ENCOUNTER
Received a notification from [employer] that NO AUTHORIZATION IS NEEDED for Rizatriptan Benzoate 10mg tablets for up to 18 tablets per month.  Forms scanned to Epic.

## 2021-04-12 ENCOUNTER — ANCILLARY PROCEDURE (OUTPATIENT)
Dept: MAMMOGRAPHY | Facility: OTHER | Age: 56
End: 2021-04-12
Attending: NURSE PRACTITIONER
Payer: COMMERCIAL

## 2021-04-12 DIAGNOSIS — Z12.31 VISIT FOR SCREENING MAMMOGRAM: ICD-10-CM

## 2021-04-12 PROCEDURE — 77063 BREAST TOMOSYNTHESIS BI: CPT | Mod: TC | Performed by: RADIOLOGY

## 2021-04-12 PROCEDURE — 77067 SCR MAMMO BI INCL CAD: CPT | Mod: TC | Performed by: RADIOLOGY

## 2021-04-12 NOTE — LETTER
April 13, 2021      Kristina Michael  [address] [address]        Dear ,    We are writing to inform you of your test results.    Your test results fall within the expected range(s) or remain unchanged from previous results.  Please continue with current treatment plan.    Resulted Orders   MA Screen Bilateral w/Venancio    Narrative    EXAM: MA SCREENING BILATERAL W/ VENANCIO, 4/12/2021 4:29 PM    COMPARISONS: 2019    HISTORY: Visit for screening mammogram    FINDINGS: No new dominant masses or malignant calcifications are  noted.    BREAST DENSITY: Scattered fibroglandular densities.      Impression    IMPRESSION: BI-RADS CATEGORY: 2 - Benign.    RECOMMENDED FOLLOW-UP: Annual Mammography.      The images were reviewed by R2 computer aided detection.    DAMIEN HOPSON MD       If you have any questions or concerns, please call the clinic at the number listed above.       Sincerely,      Dary Goddard, CNP

## 2021-04-27 ENCOUNTER — TELEPHONE (OUTPATIENT)
Dept: FAMILY MEDICINE | Facility: OTHER | Age: 56
End: 2021-04-27

## 2021-04-27 DIAGNOSIS — Z12.11 SCREENING FOR MALIGNANT NEOPLASM OF COLON: Primary | ICD-10-CM

## 2021-04-27 DIAGNOSIS — N80.9 ENDOMETRIOSIS: ICD-10-CM

## 2021-04-27 NOTE — TELEPHONE ENCOUNTER
Left message for pt to return call to clinic, need more information. Is she having issues? Where would she like to go for the colonoscopy? Negative cologuard noted in her chart 10 months ago.

## 2021-04-27 NOTE — TELEPHONE ENCOUNTER
9:16 AM    Reason for Call: Phone Call    Description: pt wants a referral for colonoscopy/ please call pt when referral is in or let her know if she needs to schedule a visit/ Thank you    Was an appointment offered for this call? No  If yes : Appointment type              Date    Preferred method for responding to this message: Telephone Call  What is your phone number ? [phone]    If we cannot reach you directly, may we leave a detailed response at the number you provided? Yes    Can this message wait until your PCP/provider returns, if available today? YES, No, provider is in    [place] Whyte

## 2021-05-03 ENCOUNTER — TELEPHONE (OUTPATIENT)
Dept: FAMILY MEDICINE | Facility: OTHER | Age: 56
End: 2021-05-03

## 2021-05-03 DIAGNOSIS — Z12.11 SCREENING FOR MALIGNANT NEOPLASM OF COLON: Primary | ICD-10-CM

## 2021-05-03 DIAGNOSIS — N80.9 ENDOMETRIOSIS: ICD-10-CM

## 2021-05-03 NOTE — TELEPHONE ENCOUNTER
Pt requesting referral for General surgery in [place]. Pts GYN requesting a stat colonoscopy be done due to the fact that the pt may need surgery for the endometriosis.

## 2021-05-04 ENCOUNTER — TELEPHONE (OUTPATIENT)
Dept: SURGERY | Facility: OTHER | Age: 56
End: 2021-05-04

## 2021-05-04 NOTE — TELEPHONE ENCOUNTER
Spoke with pt, Dr Alex Eldridge at [facility] would like the colonoscopy done due to patient needing a hysterectomy. Dr Eldridge would like to make sure that the patient doesn't have any endometriosis extending into the colon or rectum or any other abnormalities of the colon even though she had a negative cologuard within the past year. Unable to access records from [facility] because patient did not sign for them to release the records at her office visit on 3/26/21 or previous visits from care everywhere.

## 2021-05-04 NOTE — TELEPHONE ENCOUNTER
Stat colonoscopy referral in [place] received from Dary Goddard associated with diagnosis of endometriosis and colon cancer screening. She had a negative cologuard within the last year.    Discussed with Dary Goddard's nurse, Mary. She indicates that her GYN in [facility] recommended the colonoscopy due to concern for possible endometriosis in her colon. There are no records in her chart from GYN. Mary will contact patient to obtain records and request referral from her GYN with reason for colonoscopy to our office-fax [phone].

## 2021-06-15 DIAGNOSIS — N91.2 ABSENCE OF MENSTRUATION: ICD-10-CM

## 2021-06-21 DIAGNOSIS — F41.9 ANXIETY: ICD-10-CM

## 2021-06-22 DIAGNOSIS — N91.2 ABSENCE OF MENSTRUATION: ICD-10-CM

## 2021-06-22 RX ORDER — LORAZEPAM 1 MG/1
TABLET ORAL
Qty: 90 TABLET | Refills: 1 | Status: SHIPPED | OUTPATIENT
Start: 2021-06-22 | End: 2021-09-03

## 2021-06-22 NOTE — TELEPHONE ENCOUNTER
LORazepam (ATIVAN) 1 MG tablet    Last Written Prescription Date:  3/26/21  Last Fill Quantity: 90,   # refills: 1  Last Office Visit: 3/26/21  Future Office visit:       Routing refill request to provider for review/approval

## 2021-06-22 NOTE — TELEPHONE ENCOUNTER
Biest liudmila       Last Written Prescription Date:  3/12/21  Last Fill Quantity: 90,   # refills: 0  Last Office Visit: 3/26/21  Future Office visit:       Routing refill request to provider for review/approval because:  Drug not on the [facility], [facility] or [facility] refill protocol or controlled substance

## 2021-06-23 NOTE — TELEPHONE ENCOUNTER
Received call from [facility]. [facility] has purchased [facility]. They received the below script. Pharmacy staff states that [facility] pharmacy's fax and phone number have changed. [facility] pharmacy is able to receive scripts via e-scribe. Updated pharmacy in pt's chart. Please sign again to e-scribe.

## 2021-09-03 DIAGNOSIS — F41.9 ANXIETY: ICD-10-CM

## 2021-09-03 DIAGNOSIS — G43.009 MIGRAINE WITHOUT AURA AND WITHOUT STATUS MIGRAINOSUS, NOT INTRACTABLE: ICD-10-CM

## 2021-09-03 RX ORDER — RIZATRIPTAN BENZOATE 10 MG/1
TABLET ORAL
Qty: 18 TABLET | Refills: 5 | Status: SHIPPED | OUTPATIENT
Start: 2021-09-03 | End: 2022-05-05

## 2021-09-03 RX ORDER — LORAZEPAM 1 MG/1
TABLET ORAL
Qty: 90 TABLET | Refills: 0 | Status: SHIPPED | OUTPATIENT
Start: 2021-09-03 | End: 2021-10-18

## 2021-09-20 DIAGNOSIS — N91.2 ABSENCE OF MENSTRUATION: ICD-10-CM

## 2021-09-20 NOTE — TELEPHONE ENCOUNTER
Biest       Last Written Prescription Date:  6/29/2021  Last Fill Quantity: 90,   # refills: 0  Last Office Visit: 3/26/2021  Future Office visit:

## 2021-10-18 DIAGNOSIS — F41.9 ANXIETY: ICD-10-CM

## 2021-10-18 RX ORDER — LORAZEPAM 1 MG/1
TABLET ORAL
Qty: 90 TABLET | Refills: 1 | Status: SHIPPED | OUTPATIENT
Start: 2021-10-18 | End: 2021-11-22

## 2021-11-22 DIAGNOSIS — F41.9 ANXIETY: ICD-10-CM

## 2021-11-22 RX ORDER — LORAZEPAM 1 MG/1
TABLET ORAL
Qty: 90 TABLET | Refills: 1 | Status: SHIPPED | OUTPATIENT
Start: 2021-11-22 | End: 2022-03-04

## 2021-12-15 DIAGNOSIS — N91.2 ABSENCE OF MENSTRUATION: ICD-10-CM

## 2021-12-15 NOTE — TELEPHONE ENCOUNTER
Compounded [place]as      Last Written Prescription Date:  9.20.21  Last Fill Quantity: #90,   # refills: 0  Last Office Visit: 3.26.21  Future Office visit:       Routing refill request to provider for review/approval because:  Drug not on the [facility], [facility] or [facility] refill protocol or controlled substance

## 2022-01-19 ENCOUNTER — OFFICE VISIT (OUTPATIENT)
Dept: FAMILY MEDICINE | Facility: OTHER | Age: 57
End: 2022-01-19
Attending: NURSE PRACTITIONER
Payer: COMMERCIAL

## 2022-01-19 VITALS
DIASTOLIC BLOOD PRESSURE: 82 MMHG | OXYGEN SATURATION: 97 % | TEMPERATURE: 98.4 F | WEIGHT: 169.3 LBS | RESPIRATION RATE: 18 BRPM | HEART RATE: 90 BPM | SYSTOLIC BLOOD PRESSURE: 130 MMHG | BODY MASS INDEX: 26.52 KG/M2

## 2022-01-19 DIAGNOSIS — Z71.89 ADVANCED DIRECTIVES, COUNSELING/DISCUSSION: ICD-10-CM

## 2022-01-19 DIAGNOSIS — R50.9 FEVER, UNSPECIFIED FEVER CAUSE: Primary | ICD-10-CM

## 2022-01-19 DIAGNOSIS — R07.0 THROAT PAIN: ICD-10-CM

## 2022-01-19 DIAGNOSIS — R68.83 CHILLS: ICD-10-CM

## 2022-01-19 LAB
DEPRECATED S PYO AG THROAT QL EIA: NEGATIVE
FLUAV RNA SPEC QL NAA+PROBE: NEGATIVE
FLUBV RNA RESP QL NAA+PROBE: NEGATIVE
GROUP A STREP BY PCR: NOT DETECTED
RSV RNA SPEC NAA+PROBE: NEGATIVE
SARS-COV-2 RNA RESP QL NAA+PROBE: POSITIVE

## 2022-01-19 PROCEDURE — 87651 STREP A DNA AMP PROBE: CPT | Performed by: NURSE PRACTITIONER

## 2022-01-19 PROCEDURE — 99214 OFFICE O/P EST MOD 30 MIN: CPT | Performed by: NURSE PRACTITIONER

## 2022-01-19 PROCEDURE — 87637 SARSCOV2&INF A&B&RSV AMP PRB: CPT | Performed by: NURSE PRACTITIONER

## 2022-01-19 RX ORDER — AZITHROMYCIN 250 MG/1
TABLET, FILM COATED ORAL
Qty: 6 TABLET | Refills: 0 | Status: SHIPPED | OUTPATIENT
Start: 2022-01-19 | End: 2022-01-24

## 2022-01-19 ASSESSMENT — ANXIETY QUESTIONNAIRES
GAD7 TOTAL SCORE: 0
3. WORRYING TOO MUCH ABOUT DIFFERENT THINGS: NOT AT ALL
IF YOU CHECKED OFF ANY PROBLEMS ON THIS QUESTIONNAIRE, HOW DIFFICULT HAVE THESE PROBLEMS MADE IT FOR YOU TO DO YOUR WORK, TAKE CARE OF THINGS AT HOME, OR GET ALONG WITH OTHER PEOPLE: NOT DIFFICULT AT ALL
5. BEING SO RESTLESS THAT IT IS HARD TO SIT STILL: NOT AT ALL
7. FEELING AFRAID AS IF SOMETHING AWFUL MIGHT HAPPEN: NOT AT ALL
6. BECOMING EASILY ANNOYED OR IRRITABLE: NOT AT ALL
2. NOT BEING ABLE TO STOP OR CONTROL WORRYING: NOT AT ALL
1. FEELING NERVOUS, ANXIOUS, OR ON EDGE: NOT AT ALL

## 2022-01-19 ASSESSMENT — PAIN SCALES - GENERAL: PAINLEVEL: NO PAIN (0)

## 2022-01-19 ASSESSMENT — PATIENT HEALTH QUESTIONNAIRE - PHQ9
5. POOR APPETITE OR OVEREATING: NOT AT ALL
SUM OF ALL RESPONSES TO PHQ QUESTIONS 1-9: 0

## 2022-01-19 NOTE — PROGRESS NOTES
Assessment & Plan        Fever, unspecified fever cause  - Streptococcus A Rapid Scr w Reflx to PCR ([facility]/[place] Only)  - Symptomatic; Yes; 1/13/2022 Influenza A/B & SARS-CoV2 (COVID-19) Virus PCR Multiplex Nasopharyngeal; Future  - Group A Streptococcus PCR Throat Swab      Chills  - See below      Throat pain  - Streptococcus A Rapid Scr w Reflx to PCR ([facility] Only)  - Symptomatic; Yes; 1/13/2022 Influenza A/B & SARS-CoV2 (COVID-19) Virus PCR Multiplex Nose  - Group A Streptococcus PCR Throat Swab  - azithromycin (ZITHROMAX) 250 MG tablet; Take 2 tablets (500 mg) by mouth daily for 1 day, THEN 1 tablet (250 mg) daily for 4 days.      Advanced directives, counseling/discussion  - Discussed - paperwork given        Self quarantine until results return  Insure adequate fluid intake  Get plenty of rest  Monitor for temp at home, treat with OTC Tylenol or Ibuprofen per package instruction.  Humidity at home (add bacteriostatic solution to humidifier)  Please return in you do not improve  To UC or ER with persistent, worsening, or concerning symptoms        Dary Goddard, CNP  [facility]          Kristina is a 56 year old who presents for the following health issues       Acute Illness  Acute illness concerns: cough/sore throat  Onset/Duration: 1 week  Symptoms:  Fever: YES  Chills/Sweats: YES  Headache (location?): YES  Sinus Pressure: no  Conjunctivitis:  no  Ear Pain: YES: bilateral  Rhinorrhea: no  Congestion: no  Sore Throat: YES  Cough: no  Wheeze: no  Decreased Appetite: no  Nausea: no  Vomiting: no  Diarrhea: no  Dysuria/Freq.: no  Dysuria or Hematuria: no  Fatigue/Achiness: YES  Sick/Strep Exposure: YES  Therapies tried and outcome: advil and maxalt, helpful        PHQ 3/31/2020 3/26/2021 1/19/2022   PHQ-9 Total Score 0 0 0   Q9: Thoughts of better off dead/self-harm past 2 weeks Not at all Not at all Not at all         SARAVANAN-7 SCORE 3/31/2020 3/26/2021 1/19/2022   Total Score 2  0 0         Patient Active Problem List   Diagnosis     Primary insomnia     Surgical menopause     ACP (advance care planning)     H/O cold sores     Hyperlipidemia LDL goal <100     Migraine without aura and without status migrainosus, not intractable     Anxiety     Past Surgical History:   Procedure Laterality Date     DILATION AND CURETTAGE      x 2     GYN SURGERY      endometerial tiissue removed x's 2     HC TOOTH EXTRACTION W/FORCEP       HYSTERECTOMY TOTAL ABDOMINAL N/A 01/01/2000     HYSTEROSCOPY       MAMMOPLASTY REDUCTION BILATERAL      breast reduction       Social History     Tobacco Use     Smoking status: Never Smoker     Smokeless tobacco: Never Used   Substance Use Topics     Alcohol use: Yes     Family History   Problem Relation Age of Onset     Cancer - colorectal Paternal Grandfather      Alzheimer Disease Paternal Grandfather              Current Outpatient Medications   Medication Sig Dispense Refill     COMPOUND CONTAINING CONTROLLED SUBSTANCE (CMPD RX) - PHARMACY TO MIX COMPOUNDED MEDICATION BIEST 80:20/PROGESTERONE/TESTOSTERONE 0.75/40/0.25MG ALVIN  Dissolve 1 alvin between cheek and gum once daily at bedtime 90 each 0     LORazepam (ATIVAN) 1 MG tablet TAKE 1/2 TO 1 TABLET BY MOUTH EVERY 8 HOURS AS NEEDED 90 tablet 1     multivitamin, therapeutic (THERA-VIT) TABS Take 1 tablet by mouth daily       rizatriptan (MAXALT) 10 MG tablet TAKE 1 TABLET BY MOUTH AT ONSET OF HEADACHE FOR MIGRAINE 18 tablet 5     valACYclovir (VALTREX) 500 MG tablet Take 1 tablet (500 mg) by mouth 2 times daily (Patient not taking: Reported on 1/19/2022) 30 tablet 3         Allergies   Allergen Reactions     Penicillins Hives         Recent Labs   Lab Test 03/26/21  1039 02/20/19  0825 02/23/18  0938   * 134* 125*   HDL 64 73 60   TRIG 130 111 120   ALT 63* 26  --    CR 0.96 0.93 0.94   GFRESTIMATED 68 71 63   GFRESTBLACK 78 82 76   POTASSIUM 3.8 4.1 3.8   TSH 2.09 2.80 2.38          BP Readings from Last 3  Encounters:   01/19/22 130/82   03/26/21 124/72   02/20/19 122/72    Wt Readings from Last 3 Encounters:   01/19/22 76.8 kg (169 lb 4.8 oz)   03/26/21 72.6 kg (160 lb)   03/31/20 72.6 kg (160 lb)               Review of Systems   Constitutional, HEENT, cardiovascular, pulmonary, gi and gu systems are negative, except as otherwise noted.          Objective    /82 (BP Location: Left arm, Patient Position: Sitting, Cuff Size: Adult Regular)   Pulse 90   Temp 98.4  F (36.9  C) (Tympanic)   Resp 18   Wt 76.8 kg (169 lb 4.8 oz)   SpO2 97%   BMI 26.52 kg/m    Body mass index is 26.52 kg/m .       Physical Exam   GENERAL: healthy, alert and no distress  HENT: tonsillar hypertrophy and tonsillar erythema  NECK: no adenopathy, no asymmetry, masses, or scars and thyroid normal to palpation  RESP: lungs clear to auscultation - no rales, rhonchi or wheezes  CV: regular rate and rhythm, normal S1 S2, no S3 or S4, no murmur, click or rub, no peripheral edema and peripheral pulses strong  MS: no gross musculoskeletal defects noted, no edema  SKIN: no suspicious lesions or rashes  PSYCH: mentation appears normal, affect normal/bright      Results for orders placed or performed in visit on 01/19/22 (from the past 24 hour(s))   Streptococcus A Rapid Scr w Reflx to PCR ([facility]/[place] Only)    Specimen: Throat; Swab   Result Value Ref Range    Group A Strep antigen Negative Negative

## 2022-01-19 NOTE — NURSING NOTE
"Chief Complaint   Patient presents with     Cough     Pharyngitis       Initial /82 (BP Location: Left arm, Patient Position: Sitting, Cuff Size: Adult Regular)   Pulse 90   Temp 98.4  F (36.9  C) (Tympanic)   Resp 18   Wt 76.8 kg (169 lb 4.8 oz)   SpO2 97%   BMI 26.52 kg/m   Estimated body mass index is 26.52 kg/m  as calculated from the following:    Height as of 3/26/21: 1.702 m (5' 7\").    Weight as of this encounter: 76.8 kg (169 lb 4.8 oz).  Medication Reconciliation: complete  Shanna Ospina LPN  "

## 2022-01-19 NOTE — PATIENT INSTRUCTIONS
Assessment & Plan        Fever, unspecified fever cause  - Streptococcus A Rapid Scr w Reflx to PCR ([facility] Only)  - Symptomatic; Yes; 1/13/2022 Influenza A/B & SARS-CoV2 (COVID-19) Virus PCR Multiplex Nasopharyngeal; Future  - Group A Streptococcus PCR Throat Swab      Chills  - See below      Throat pain  - Streptococcus A Rapid Scr w Reflx to PCR ([facility] [place] [facility] Only)  - Symptomatic; Yes; 1/13/2022 Influenza A/B & SARS-CoV2 (COVID-19) Virus PCR Multiplex Nose  - Group A Streptococcus PCR Throat Swab  - azithromycin (ZITHROMAX) 250 MG tablet; Take 2 tablets (500 mg) by mouth daily for 1 day, THEN 1 tablet (250 mg) daily for 4 days.      Advanced directives, counseling/discussion  - Discussed - Paperwork given        Self quarantine until results return  Insure adequate fluid intake  Get plenty of rest  Monitor for temp at home, treat with OTC Tylenol or Ibuprofen per package instruction.  Humidity at home (add bacteriostatic solution to humidifier)  Please return in you do not improve  To UC or ER with persistent, worsening, or concerning symptoms        Dary Goddard, CNP  [facility] [place] [facility]

## 2022-01-19 NOTE — RESULT ENCOUNTER NOTE
I notified patient by phone at 1:31 pm      COVID positive lab result      Self quarantine, avoid contact with family members, stay within your home  Aspirin 81 mg daily unless this is contraindicated for you for a healthcare reason  OTC Zinc per package instruction  Vitamin D3 5000 U daily for 2 weeks  Multiple Vitamin Daily  Insure adequate fluid intake  Get plenty of rest  Monitor often for temp at home, treat with OTC Tylenol, do not take Ibuprofen   Humidity at home   To UC or ER with persistent, worsening, or concerning symptoms  Please reach out to us if you have any questions or concerns regarding your care

## 2022-01-20 ENCOUNTER — TELEPHONE (OUTPATIENT)
Dept: NURSING | Facility: CLINIC | Age: 57
End: 2022-01-20

## 2022-01-20 ASSESSMENT — ANXIETY QUESTIONNAIRES: GAD7 TOTAL SCORE: 0

## 2022-01-20 NOTE — TELEPHONE ENCOUNTER
"Coronavirus (COVID-19) Notification    Caller Name (Patient, parent, daughter/son, grandparent, etc)Kristina Acevedo  Reason for call  Notify of Positive Coronavirus (COVID-19) lab results, assess symptoms,  review [facility] recommendations    Lab Result    Lab test:  2019-nCoV rRt-PCR or SARS-CoV-2 PCR    Oropharyngeal AND/OR nasopharyngeal swabs is POSITIVE for 2019-nCoV RNA/SARS-COV-2 PCR (COVID-19 virus)    RN Recommendations/Instructions per [facility] Coronavirus COVID-19 recommendations    Brief introduction script  Introduce self then review script:  \"I am calling on behalf of [employer].  We were notified that your Coronavirus test (COVID-19) for was POSITIVE for the virus.  I have some information to relay to you but first I wanted to mention that the [place] Dept of Health will be contacting you shortly [it's possible MD already called Patient] to talk to you more about how you are feeling and other people you have had contact with who might now also have the virus.  Also, [facility] is Partnering with the [facility] for Covid-19 research, you may be contacted directly by research staff.\"    Assessment (Inquire about Patient's current symptoms)   Assessment   Current Symptoms at time of phone call: (if no symptoms, document No symptoms] tired   Symptoms onset (if applicable) 01/12/2000     If at time of call, Patients symptoms hare worsened, the Patient should contact 911 or have someone drive them to Emergency Dept promptly:      If Patient calling 911, inform 911 personal that you have tested positive for the Coronavirus (COVID-19).  Place mask on and await 911 to arrive.    If Emergency Dept, If possible, please have another adult drive you to the Emergency Dept but you need to wear mask when in contact with other people.      Monoclonal Antibody Administration    Is the patient symptomatic at the time of result notification? No.  Does the patient fit the criteria: Patient " declined    Review information with Patient    Your result was positive. This means you have COVID-19 (coronavirus).  We have sent you a letter that reviews the information that I'll be reviewing with you now.    How can I protect others?    If you have symptoms: stay home and away from others (self-isolate) until:    You've had no fever--and no medicine that reduces fever--for 1 full day (24 hours). And       Your other symptoms have gotten better. For example, your cough or breathing has improved. And     At least 10 days have passed since your symptoms started. (If you've been told by a doctor that you have a weak immune system, wait 20 days.)     If you don't have symptoms: Stay home and away from others (self-isolate) until at least 10 days have passed since your first positive COVID-19 test. (Date test collected)    During this time:    Stay in your own room, including for meals. Use your own bathroom if you can.    Stay away from others in your home. No hugging, kissing or shaking hands. No visitors.     Don't go to work, school or anywhere else.     Clean  high touch  surfaces often (doorknobs, counters, handles, etc.). Use a household cleaning spray or wipes. You'll find a full list on the EPA website at www.epa.gov/pesticide-registration/list-n-disinfectants-use-against-sars-cov-2.     Cover your mouth and nose with a mask, tissue or other face covering to avoid spreading germs.    Wash your hands and face often with soap and water.    Make a list of people you have been in close contact with recently, even if either of you wore a face covering.   - Start your list from 2 days before you became ill or had a positive test.  - Include anyone that was within 6 feet of you for a cumulative total of 15 minutes or more in 24 hours. (Example: if you sat next to Jemal for 5 minutes in the morning and 10 minutes in the afternoon, then you were in close contact for 15 minutes total that day. Jemal would be added to  your list.)    A public health worker will call or text you. It is important that you answer. They will ask you questions about possible exposures to COVID-19, such as people you have been in direct contact with and places you have visited.    Tell the people on your list that you have COVID-19; they should stay away from others for 14 days starting from the last time they were in contact with you (unless you are told something different from a public health worker).     Caregivers in these groups are at risk for severe illness due to COVID-19:  o People 65 years and older  o People who live in a nursing home or long-term care facility  o People with chronic disease (lung, heart, cancer, diabetes, kidney, liver, immunologic)  o People who have a weakened immune system, including those who:  - Are in cancer treatment  - Take medicine that weakens the immune system, such as corticosteroids  - Had a bone marrow or organ transplant  - Have an immune deficiency  - Have poorly controlled HIV or AIDS  - Are obese (body mass index of 40 or higher)  - Smoke regularly    Caregivers should wear gloves while washing dishes, handling laundry and cleaning bedrooms and bathrooms.    Wash and dry laundry with special caution. Don't shake dirty laundry, and use the warmest water setting you can.    If you have a weakened immune system, ask your doctor about other actions you should take.    For more tips, go to www.cdc.gov/coronavirus/2019-ncov/downloads/10Things.pdf.    You should not go back to work until you meet the guidelines above for ending your home isolation. You don't need to be retested for COVID-19 before going back to work--studies show that you won't spread the virus if it's been at least 10 days since your symptoms started (or 20 days, if you have a weak immune system).    Employers: This document serves as formal notice of your employee's medical guidelines for going back to work. They must meet the above guidelines  before going back to work in person.    How can I take care of myself?    1. Get lots of rest. Drink extra fluids (unless a doctor has told you not to).    2. Take Tylenol (acetaminophen) for fever or pain. If you have liver or kidney problems, ask your family doctor if it's okay to take Tylenol.     Take either:     650 mg (two 325 mg pills) every 4 to 6 hours, or     1,000 mg (two 500 mg pills) every 8 hours as needed.     Note: Don't take more than 3,000 mg in one day. Acetaminophen is found in many medicines (both prescribed and over-the-counter medicines). Read all labels to be sure you don't take too much.    For children, check the Tylenol bottle for the right dose (based on their age or weight).    3. If you have other health problems (like cancer, heart failure, an organ transplant or severe kidney disease): Call your specialty clinic if you don't feel better in the next 2 days.    4. Know when to call 911: Emergency warning signs include:    Trouble breathing or shortness of breath    Pain or pressure in the chest that doesn't go away    Feeling confused like you haven't felt before, or not being able to wake up    Bluish-colored lips or face    5. Sign up for [employer]. We know it's scary to hear that you have COVID-19. We want to track your symptoms to make sure you're okay over the next 2 weeks. Please look for an email from [employer]--this is a free, online program that we'll use to keep in touch. To sign up, follow the link in the email. Learn more at [email or web address].    Where can I get more information?    [facility]: [email or web address]    Coronavirus Basics: www.health.[facility].[place]/diseases/coronavirus/basics.html    What to Do If You're Sick: www.cdc.gov/coronavirus/2019-ncov/about/steps-when-sick.html    Ending Home Isolation: www.cdc.gov/coronavirus/2019-ncov/hcp/disposition-in-home-patients.html     Caring for Someone with COVID-19:  www.cdc.gov/coronavirus/2019-ncov/if-you-are-sick/care-for-someone.html     [facility] clinical trials (COVID-19 research studies): [email or web address].[facility]-clinical-trials     A Positive COVID-19 letter will be sent via payever or the mail. (Exception, no letters sent to Presurgerical/Preprocedure Patients)    Joe Marmolejo RN

## 2022-01-21 NOTE — PROGRESS NOTES
[facility]  [address]  [facility] [address]  Phone: [phone]  Primary Provider: Dary Burks  Pre-op Performing Provider: DARY BURKS      PREOPERATIVE EVALUATION:  Today's date: 1/24/2022      Kristina Michael is a 56 year old female who presents for a preoperative evaluation.    Surgical Information:  Surgery/Procedure: Robotic assisted excision of endometriosis, cystoscopy  Surgery Location: [facility]   Surgeon: Dr. Huynh  Note does not need to be faxed, will be available electronically in Epic.Surgery Date: 01/27/2022  Time of Surgery: TBD  Where patient plans to recover: At home with family  Fax number for surgical facility: Note does not need to be faxed, will be available electronically in Epic.  Type of Anesthesia Anticipated: To be determined        Kristina was diagnosed with COVID on 1/19/22 following 3 negative home tests  Her symptoms began 1/12/22        HPI related to upcoming procedure:     Endometriosis of pelvis, pelvic pain        Preop Questions 1/24/2022   1. Have you ever had a heart attack or stroke? No   2. Have you ever had surgery on your heart or blood vessels, such as a stent placement, a coronary artery bypass, or surgery on an artery in your head, neck, heart, or legs? No   3. Do you have chest pain with activity? No   4. Do you have a history of  heart failure? No   5. Do you currently have a cold, bronchitis or symptoms of other infection? No   6. Do you have a cough, shortness of breath, or wheezing? No   7. Do you or anyone in your family have previous history of blood clots? No   8. Do you or does anyone in your family have a serious bleeding problem such as prolonged bleeding following surgeries or cuts? No   9. Have you ever had problems with anemia or been told to take iron pills? No   10. Have you had any abnormal blood loss such as black, tarry or bloody stools, or abnormal vaginal bleeding? No   11. Have you ever  had a blood transfusion? No   12. Are you willing to have a blood transfusion if it is medically needed before, during, or after your surgery? Yes   13. Have you or any of your relatives ever had problems with anesthesia? No   14. Do you have sleep apnea, excessive snoring or daytime drowsiness? No   15. Do you have any artifical heart valves or other implanted medical devices like a pacemaker, defibrillator, or continuous glucose monitor? No   16. Do you have artificial joints? No   17. Are you allergic to latex? No   18. Is there any chance that you may be pregnant? No         Health Care Directive:  Patient does not have a Health Care Directive or Living Will: Advance Directive received and scanned. Click on Code in the patient header to view.      Status of Chronic Conditions:  See problem list for active medical problems.  Problems all longstanding and stable, except as noted/documented.  See ROS for pertinent symptoms related to these conditions.        Review of Systems  CONSTITUTIONAL: NEGATIVE for fever, chills, change in weight  INTEGUMENTARY/SKIN: NEGATIVE for worrisome rashes, moles or lesions  EYES: NEGATIVE for vision changes or irritation  ENT/MOUTH: NEGATIVE for ear, mouth and throat problems  RESP: NEGATIVE for significant cough or SOB  CV: NEGATIVE for chest pain, palpitations or peripheral edema  GI: NEGATIVE for nausea, abdominal pain, heartburn, or change in bowel habits  : NEGATIVE for frequency, dysuria, or hematuria  MUSCULOSKELETAL: NEGATIVE for significant arthralgias or myalgia  NEURO: NEGATIVE for weakness, dizziness or paresthesias  ENDOCRINE: NEGATIVE for temperature intolerance, skin/hair changes  HEME: NEGATIVE for bleeding problems  PSYCHIATRIC: NEGATIVE for changes in mood or affect        Patient Active Problem List    Diagnosis Date Noted     Anxiety 02/19/2019     Priority: Medium     Migraine without aura and without status migrainosus, not intractable 02/23/2018     Priority:  Medium     Advanced directives, counseling/discussion 10/14/2016     Priority: Medium     Advance Care Planning 10/14/2016: ACP Review of Chart / Resources Provided:  Reviewed chart for advance care plan.  Kristina Michael has been given information  Added by Jacquelyn Hassan           H/O cold sores 10/14/2016     Priority: Medium     Hyperlipidemia LDL goal <100 10/14/2016     Priority: Medium     Primary insomnia 05/12/2015     Priority: Medium     Surgical menopause 05/12/2015     Priority: Medium          Past Medical History:   Diagnosis Date     Anxiety 2/19/2019     H/O cold sores 10/14/2016     Headache(784.0) 5/12/2015     Hyperlipidemia LDL goal <100 10/14/2016     Insomnia 5/12/2015     Migraine without aura and without status migrainosus, not intractable 2/23/2018     Primary insomnia 5/12/2015     Surgical menopause 5/12/2015       Past Surgical History:   Procedure Laterality Date     DILATION AND CURETTAGE      x 2     GYN SURGERY      endometerial tiissue removed x's 2     HC TOOTH EXTRACTION W/FORCEP       HYSTERECTOMY TOTAL ABDOMINAL N/A 01/01/2000     HYSTEROSCOPY       MAMMOPLASTY REDUCTION BILATERAL      breast reduction       Current Outpatient Medications   Medication Sig Dispense Refill     COMPOUND CONTAINING CONTROLLED SUBSTANCE (CMPD RX) - PHARMACY TO MIX COMPOUNDED MEDICATION BIEST 80:20/PROGESTERONE/TESTOSTERONE 0.75/40/0.25MG ALVIN  Dissolve 1 alvin between cheek and gum once daily at bedtime 90 each 0     LORazepam (ATIVAN) 1 MG tablet TAKE 1/2 TO 1 TABLET BY MOUTH EVERY 8 HOURS AS NEEDED 90 tablet 1     multivitamin, therapeutic (THERA-VIT) TABS Take 1 tablet by mouth daily       rizatriptan (MAXALT) 10 MG tablet TAKE 1 TABLET BY MOUTH AT ONSET OF HEADACHE FOR MIGRAINE 18 tablet 5     valACYclovir (VALTREX) 500 MG tablet Take 1 tablet (500 mg) by mouth 2 times daily (Patient not taking: Reported on 1/19/2022) 30 tablet 3       Allergies   Allergen Reactions     Penicillins Hives     "    Social History     Tobacco Use     Smoking status: Never Smoker     Smokeless tobacco: Never Used   Substance Use Topics     Alcohol use: Yes       Family History   Problem Relation Age of Onset     Cancer - colorectal Paternal Grandfather      Alzheimer Disease Paternal Grandfather        History   Drug Use No           Objective     /70 (BP Location: Right arm, Patient Position: Chair, Cuff Size: Adult Large)   Pulse 96   Temp 98.4  F (36.9  C) (Tympanic)   Resp 18   Ht 1.702 m (5' 7\")   Wt 74.4 kg (164 lb)   SpO2 96%   BMI 25.69 kg/m          Physical Exam    GENERAL APPEARANCE: healthy, alert and no distress     EYES: EOMI, PERRL     HENT: ear canals and TM's normal and nose and mouth without ulcers or lesions     NECK: no adenopathy, no asymmetry, masses, or scars and thyroid normal to palpation     RESP: lungs clear to auscultation - no rales, rhonchi or wheezes     CV: regular rates and rhythm, normal S1 S2, no S3 or S4 and no murmur, click or rub     ABDOMEN:  soft, nontender, no HSM or masses and bowel sounds normal     MS: extremities normal- no gross deformities noted, no evidence of inflammation in joints, FROM in all extremities.     SKIN: no suspicious lesions or rashes     NEURO: Normal strength and tone, sensory exam grossly normal, mentation intact and speech normal     PSYCH: mentation appears normal. and affect normal/bright     LYMPHATICS: No cervical adenopathy        Recent Labs   Lab Test 03/26/21  1039   HGB 15.0         POTASSIUM 3.8   CR 0.96          Diagnostics:  Recent Results (from the past 24 hour(s))   Comprehensive metabolic panel    Collection Time: 01/24/22 10:43 AM   Result Value Ref Range    Sodium 137 133 - 144 mmol/L    Potassium 3.7 3.4 - 5.3 mmol/L    Chloride 106 94 - 109 mmol/L    Carbon Dioxide (CO2) 23 20 - 32 mmol/L    Anion Gap 8 3 - 14 mmol/L    Urea Nitrogen 14 7 - 30 mg/dL    Creatinine 1.02 0.52 - 1.04 mg/dL    Calcium 9.2 8.5 - 10.1 " mg/dL    Glucose 95 70 - 99 mg/dL    Alkaline Phosphatase 63 40 - 150 U/L    AST 40 0 - 45 U/L    ALT 53 (H) 0 - 50 U/L    Protein Total 7.5 6.8 - 8.8 g/dL    Albumin 4.2 3.4 - 5.0 g/dL    Bilirubin Total 0.8 0.2 - 1.3 mg/dL    GFR Estimate 65 >60 mL/min/1.73m2   CBC with platelets and differential    Collection Time: 01/24/22 10:43 AM   Result Value Ref Range    WBC Count 6.1 4.0 - 11.0 10e3/uL    RBC Count 5.03 3.80 - 5.20 10e6/uL    Hemoglobin 15.0 11.7 - 15.7 g/dL    Hematocrit 43.5 35.0 - 47.0 %    MCV 87 78 - 100 fL    MCH 29.8 26.5 - 33.0 pg    MCHC 34.5 31.5 - 36.5 g/dL    RDW 12.0 10.0 - 15.0 %    Platelet Count 334 150 - 450 10e3/uL    % Neutrophils 38 %    % Lymphocytes 46 %    % Monocytes 13 %    % Eosinophils 3 %    % Basophils 0 %    Absolute Neutrophils 2.3 1.6 - 8.3 10e3/uL    Absolute Lymphocytes 2.8 0.8 - 5.3 10e3/uL    Absolute Monocytes 0.8 0.0 - 1.3 10e3/uL    Absolute Eosinophils 0.2 0.0 - 0.7 10e3/uL    Absolute Basophils 0.0 0.0 - 0.2 10e3/uL            EKG: appears normal, NSR, unchanged from previous tracings      Revised Cardiac Risk Index (RCRI):  The patient has the following serious cardiovascular risks for perioperative complications:   - No serious cardiac risks = 0 points       RCRI Interpretation: 0 points: Class I (very low risk - 0.4% complication rate)        Assessment & Plan     The proposed surgical procedure is considered LOW risk.    Preop general physical exam  - EKG 12-lead complete w/read - (Clinic Performed)  - CBC with platelets and differential  - Comprehensive metabolic panel    Endometriosis of pelvis  - See above    Pelvic pain in female  - See above        Risks and Recommendations:  The patient has the following additional risks and recommendations for perioperative complications:   - No identified additional risk factors other than previously addressed        RECOMMENDATION:  APPROVAL GIVEN to proceed with proposed procedure, without further diagnostic  evaluation.       Signed Electronically by: Dary Goddard CNP  Copy of this evaluation report is provided to requesting physician.

## 2022-01-21 NOTE — PATIENT INSTRUCTIONS
Preparing for Your Surgery  Getting started  A nurse will call you to review your health history and instructions. They will give you an arrival time based on your scheduled surgery time. Please be ready to share:    Your doctor's clinic name and phone number    Your medical, surgical and anesthesia history    A list of allergies and sensitivities    A list of medicines, including herbal treatments and over-the-counter drugs    Whether the patient has a legal guardian (ask how to send us the papers in advance)  Please tell us if you're pregnant--or if there's any chance you might be pregnant. Some surgeries may injure a fetus (unborn baby), so they require a pregnancy test. Surgeries that are safe for a fetus don't always need a test, and you can choose whether to have one.   If you have a child who's having surgery, please ask for a copy of Preparing for Your Child's Surgery.    Preparing for surgery    Within 30 days of surgery: Have a pre-op exam (sometimes called an H&P, or History and Physical). This can be done at a clinic or pre-operative center.  ? If you're having a , you may not need this exam. Talk to your care team.    At your pre-op exam, talk to your care team about all medicines you take. If you need to stop any medicines before surgery, ask when to start taking them again.  ? We do this for your safety. Many medicines can make you bleed too much during surgery. Some change how well surgery (anesthesia) drugs work.    Call your insurance company to let them know you're having surgery. (If you don't have insurance, call [phone].)    Call your clinic if there's any change in your health. This includes signs of a cold or flu (sore throat, runny nose, cough, rash, fever). It also includes a scrape or scratch near the surgery site.    If you have questions on the day of surgery, call your hospital or surgery center.  COVID testing  You may need to be tested for COVID-19 before having  surgery. If so, your surgical team will give you instructions for scheduling this test, separate from your preoperative history and physical.  Eating and drinking guidelines  For your safety: Unless your surgeon tells you otherwise, follow the guidelines below.    Eat and drink as usual until 8 hours before surgery. After that, no food or milk.    Drink clear liquids until 2 hours before surgery. These are liquids you can see through, like water, Gatorade and Propel Water. You may also have black coffee and tea (no cream or milk).    Nothing by mouth within 2 hours of surgery. This includes gum, candy and breath mints.    If you drink alcohol: Stop drinking it the night before surgery.    If your care team tells you to take medicine on the morning of surgery, it's okay to take it with a sip of water.  Preventing infection    Shower or bathe the night before and morning of your surgery. Follow the instructions your clinic gave you. (If no instructions, use regular soap.)    Don't shave or clip hair near your surgery site. We'll remove the hair if needed.    Don't smoke or vape the morning of surgery. You may chew nicotine gum up to 2 hours before surgery. A nicotine patch is okay.  ? Note: Some surgeries require you to completely quit smoking and nicotine. Check with your surgeon.    Your care team will make every effort to keep you safe from infection. We will:  ? Clean our hands often with soap and water (or an alcohol-based hand rub).  ? Clean the skin at your surgery site with a special soap that kills germs.  ? Give you a special gown to keep you warm. (Cold raises the risk of infection.)  ? Wear special hair covers, masks, gowns and gloves during surgery.  ? Give antibiotic medicine, if prescribed. Not all surgeries need antibiotics.  What to bring on the day of surgery    Photo ID and insurance card    Copy of your health care directive, if you have one    Glasses and hearing aides (bring cases)  ? You can't  wear contacts during surgery    Inhaler and eye drops, if you use them (tell us about these when you arrive)    CPAP machine or breathing device, if you use them    A few personal items, if spending the night    If you have . . .  ? A pacemaker, ICD (cardiac defibrillator) or other implant: Bring the ID card.  ? An implanted stimulator: Bring the remote control.  ? A legal guardian: Bring a copy of the certified (court-stamped) guardianship papers.  Please remove any jewelry, including body piercings. Leave jewelry and other valuables at home.  If you're going home the day of surgery    You must have a responsible adult drive you home. They should stay with you overnight as well.    If you don't have someone to stay with you, and you aren't safe to go home alone, we may keep you overnight. Insurance often won't pay for this.  After surgery  If it's hard to control your pain or you need more pain medicine, please call your surgeon's office.  Questions?   If you have any questions for your care team, list them here: _________________________________________________________________________________________________________________________________________________________________________ ____________________________________ ____________________________________ ____________________________________  For informational purposes only. Not to replace the advice of your health care provider. Copyright   2003, 2019 [facility]. All rights reserved. Clinically reviewed by Ladonna Douglass MD. [place] [id number] - REV 07/21.

## 2022-01-24 ENCOUNTER — OFFICE VISIT (OUTPATIENT)
Dept: FAMILY MEDICINE | Facility: OTHER | Age: 57
End: 2022-01-24
Attending: NURSE PRACTITIONER
Payer: COMMERCIAL

## 2022-01-24 VITALS
WEIGHT: 164 LBS | OXYGEN SATURATION: 96 % | HEART RATE: 96 BPM | BODY MASS INDEX: 25.74 KG/M2 | SYSTOLIC BLOOD PRESSURE: 130 MMHG | HEIGHT: 67 IN | RESPIRATION RATE: 18 BRPM | DIASTOLIC BLOOD PRESSURE: 70 MMHG | TEMPERATURE: 98.4 F

## 2022-01-24 DIAGNOSIS — R10.2 PELVIC PAIN IN FEMALE: ICD-10-CM

## 2022-01-24 DIAGNOSIS — Z01.818 PREOP GENERAL PHYSICAL EXAM: Primary | ICD-10-CM

## 2022-01-24 LAB
ALBUMIN SERPL-MCNC: 4.2 G/DL (ref 3.4–5)
ALP SERPL-CCNC: 63 U/L (ref 40–150)
ALT SERPL W P-5'-P-CCNC: 53 U/L (ref 0–50)
ANION GAP SERPL CALCULATED.3IONS-SCNC: 8 MMOL/L (ref 3–14)
AST SERPL W P-5'-P-CCNC: 40 U/L (ref 0–45)
BASOPHILS # BLD AUTO: 0 10E3/UL (ref 0–0.2)
BASOPHILS NFR BLD AUTO: 0 %
BILIRUB SERPL-MCNC: 0.8 MG/DL (ref 0.2–1.3)
BUN SERPL-MCNC: 14 MG/DL (ref 7–30)
CALCIUM SERPL-MCNC: 9.2 MG/DL (ref 8.5–10.1)
CHLORIDE BLD-SCNC: 106 MMOL/L (ref 94–109)
CO2 SERPL-SCNC: 23 MMOL/L (ref 20–32)
CREAT SERPL-MCNC: 1.02 MG/DL (ref 0.52–1.04)
EOSINOPHIL # BLD AUTO: 0.2 10E3/UL (ref 0–0.7)
EOSINOPHIL NFR BLD AUTO: 3 %
ERYTHROCYTE [DISTWIDTH] IN BLOOD BY AUTOMATED COUNT: 12 % (ref 10–15)
GFR SERPL CREATININE-BSD FRML MDRD: 65 ML/MIN/1.73M2
GLUCOSE BLD-MCNC: 95 MG/DL (ref 70–99)
HCT VFR BLD AUTO: 43.5 % (ref 35–47)
HGB BLD-MCNC: 15 G/DL (ref 11.7–15.7)
LYMPHOCYTES # BLD AUTO: 2.8 10E3/UL (ref 0.8–5.3)
LYMPHOCYTES NFR BLD AUTO: 46 %
MCH RBC QN AUTO: 29.8 PG (ref 26.5–33)
MCHC RBC AUTO-ENTMCNC: 34.5 G/DL (ref 31.5–36.5)
MCV RBC AUTO: 87 FL (ref 78–100)
MONOCYTES # BLD AUTO: 0.8 10E3/UL (ref 0–1.3)
MONOCYTES NFR BLD AUTO: 13 %
NEUTROPHILS # BLD AUTO: 2.3 10E3/UL (ref 1.6–8.3)
NEUTROPHILS NFR BLD AUTO: 38 %
PLATELET # BLD AUTO: 334 10E3/UL (ref 150–450)
POTASSIUM BLD-SCNC: 3.7 MMOL/L (ref 3.4–5.3)
PROT SERPL-MCNC: 7.5 G/DL (ref 6.8–8.8)
RBC # BLD AUTO: 5.03 10E6/UL (ref 3.8–5.2)
SODIUM SERPL-SCNC: 137 MMOL/L (ref 133–144)
WBC # BLD AUTO: 6.1 10E3/UL (ref 4–11)

## 2022-01-24 PROCEDURE — 93000 ELECTROCARDIOGRAM COMPLETE: CPT | Mod: 77 | Performed by: INTERNAL MEDICINE

## 2022-01-24 PROCEDURE — 85025 COMPLETE CBC W/AUTO DIFF WBC: CPT | Performed by: NURSE PRACTITIONER

## 2022-01-24 PROCEDURE — 36415 COLL VENOUS BLD VENIPUNCTURE: CPT | Performed by: NURSE PRACTITIONER

## 2022-01-24 PROCEDURE — 80053 COMPREHEN METABOLIC PANEL: CPT | Performed by: NURSE PRACTITIONER

## 2022-01-24 PROCEDURE — 99214 OFFICE O/P EST MOD 30 MIN: CPT | Performed by: NURSE PRACTITIONER

## 2022-01-24 ASSESSMENT — MIFFLIN-ST. JEOR: SCORE: 1376.53

## 2022-01-24 ASSESSMENT — PAIN SCALES - GENERAL: PAINLEVEL: NO PAIN (0)

## 2022-01-24 NOTE — NURSING NOTE
"Chief Complaint   Patient presents with     Pre-Op Exam       Initial BP (!) 148/94 (BP Location: Right arm, Patient Position: Chair, Cuff Size: Adult Large)   Pulse 96   Temp 98.4  F (36.9  C) (Tympanic)   Resp 18   Ht 1.702 m (5' 7\")   Wt 74.4 kg (164 lb)   SpO2 96%   BMI 25.69 kg/m   Estimated body mass index is 25.69 kg/m  as calculated from the following:    Height as of this encounter: 1.702 m (5' 7\").    Weight as of this encounter: 74.4 kg (164 lb).  Medication Reconciliation: complete  Petty Coffman LPN      "

## 2022-01-26 ENCOUNTER — DOCUMENTATION ONLY (OUTPATIENT)
Dept: OTHER | Facility: CLINIC | Age: 57
End: 2022-01-26

## 2022-03-02 DIAGNOSIS — F41.9 ANXIETY: ICD-10-CM

## 2022-03-04 RX ORDER — LORAZEPAM 1 MG/1
TABLET ORAL
Qty: 90 TABLET | Refills: 1 | Status: SHIPPED | OUTPATIENT
Start: 2022-03-04 | End: 2022-06-02

## 2022-03-04 NOTE — TELEPHONE ENCOUNTER
LORazepam (ATIVAN) 1 MG tablet      Last Written Prescription Date:  11-22-21  Last Fill Quantity: 90,   # refills: 1  Last Office Visit: 1-24-22  Future Office visit:       Routing refill request to provider for review/approval because:  Drug not on the [facility], [facility] or [facility] refill protocol or controlled substance

## 2022-03-11 DIAGNOSIS — N91.2 ABSENCE OF MENSTRUATION: ICD-10-CM

## 2022-03-11 NOTE — TELEPHONE ENCOUNTER
BIEST 80:20/PROGESTERONE/TESTOSTERONE 0.75/40/0.25MG ALVIN         Last Written Prescription Date:  12/15/21  Last Fill Quantity: 90,   # refills: 0  Last Office Visit: 1/24/22  Future Office visit:       Routing refill request to provider for review/approval because:    Drug not on the [facility], [facility] or [facility] refill protocol or controlled substance

## 2022-05-05 DIAGNOSIS — G43.009 MIGRAINE WITHOUT AURA AND WITHOUT STATUS MIGRAINOSUS, NOT INTRACTABLE: ICD-10-CM

## 2022-05-05 RX ORDER — RIZATRIPTAN BENZOATE 10 MG/1
TABLET ORAL
Qty: 18 TABLET | Refills: 5 | Status: SHIPPED | OUTPATIENT
Start: 2022-05-05 | End: 2022-12-02

## 2022-05-05 NOTE — TELEPHONE ENCOUNTER
rizatriptan (MAXALT) 10 MG tablet      Last Written Prescription Date:  9-3-21  Last Fill Quantity: 18,   # refills: 5  Last Office Visit: 1-24-22  Future Office visit:       Routing refill request to provider for review/approval because:   Serotonin Agonist request needs review.

## 2022-06-01 DIAGNOSIS — F41.9 ANXIETY: ICD-10-CM

## 2022-06-02 RX ORDER — LORAZEPAM 1 MG/1
TABLET ORAL
Qty: 90 TABLET | Refills: 0 | Status: SHIPPED | OUTPATIENT
Start: 2022-06-02 | End: 2022-07-13

## 2022-06-02 NOTE — TELEPHONE ENCOUNTER
Ativan      Last Written Prescription Date:  4.5.22  Last Fill Quantity: #90,   # refills: 0  Last Office Visit: 1.24.22  Future Office visit:       Routing refill request to provider for review/approval because:  Drug not on the [facility], [facility] or [facility] refill protocol or controlled substance

## 2022-06-08 DIAGNOSIS — N91.2 ABSENCE OF MENSTRUATION: ICD-10-CM

## 2022-06-08 NOTE — TELEPHONE ENCOUNTER
Biest      Last Written Prescription Date:  3.11.22  Last Fill Quantity: #90,   # refills: 0  Last Office Visit: 1.24.22  Future Office visit:       Routing refill request to provider for review/approval because:  Drug not on the [facility], [facility] or [facility] refill protocol or controlled substance

## 2022-07-11 DIAGNOSIS — F41.9 ANXIETY: ICD-10-CM

## 2022-07-13 RX ORDER — LORAZEPAM 1 MG/1
TABLET ORAL
Qty: 90 TABLET | Refills: 0 | Status: SHIPPED | OUTPATIENT
Start: 2022-07-13 | End: 2022-08-18

## 2022-07-13 NOTE — TELEPHONE ENCOUNTER
ativan      Last Written Prescription Date:  6/2/22  Last Fill Quantity: 90,   # refills: 0  Last Office Visit: 1/24/22  Future Office visit:

## 2022-08-16 DIAGNOSIS — F41.9 ANXIETY: ICD-10-CM

## 2022-08-18 RX ORDER — LORAZEPAM 1 MG/1
TABLET ORAL
Qty: 90 TABLET | Refills: 0 | Status: SHIPPED | OUTPATIENT
Start: 2022-08-18 | End: 2022-09-15

## 2022-08-18 NOTE — TELEPHONE ENCOUNTER
LORazepam (ATIVAN) 1 MG tablet      Last Written Prescription Date:  7/13/22  Last Fill Quantity: 90,   # refills: 0  Last Office Visit: 1/24/22  Future Office visit:       Routing refill request to provider for review/approval because:  Drug not on the [facility], [facility] or [facility] refill protocol or controlled substance

## 2022-09-09 DIAGNOSIS — N91.2 ABSENCE OF MENSTRUATION: ICD-10-CM

## 2022-09-09 NOTE — TELEPHONE ENCOUNTER
Biest      Last Written Prescription Date:  6.8.22  Last Fill Quantity: #90,   # refills: 0  Last Office Visit: 1.24.22  Future Office visit:       Routing refill request to provider for review/approval because:  Drug not on the [facility], [facility] or [facility] refill protocol or controlled substance

## 2022-10-20 DIAGNOSIS — F41.9 ANXIETY: ICD-10-CM

## 2022-10-21 RX ORDER — LORAZEPAM 1 MG/1
TABLET ORAL
Qty: 90 TABLET | Refills: 1 | Status: SHIPPED | OUTPATIENT
Start: 2022-10-21 | End: 2022-12-13

## 2022-10-21 NOTE — TELEPHONE ENCOUNTER
LORazepam      Last Written Prescription Date:  9/15/22  Last Fill Quantity: 90,   # refills: 0  Last Office Visit: 1/24/22  Future Office visit:    Next 5 appointments (look out 90 days)    Nov 18, 2022  1:45 PM  (Arrive by 1:30 PM)  SHORT with Dary Goddard CNP  [facility] ([facility] ) [address]  [facility] [address]  [phone]           Routing refill request to provider for review/approval because:

## 2022-11-02 DIAGNOSIS — Z12.31 ENCOUNTER FOR SCREENING MAMMOGRAM FOR MALIGNANT NEOPLASM OF BREAST: Primary | ICD-10-CM

## 2022-12-01 DIAGNOSIS — G43.009 MIGRAINE WITHOUT AURA AND WITHOUT STATUS MIGRAINOSUS, NOT INTRACTABLE: ICD-10-CM

## 2022-12-02 RX ORDER — RIZATRIPTAN BENZOATE 10 MG/1
TABLET ORAL
Qty: 18 TABLET | Refills: 5 | Status: SHIPPED | OUTPATIENT
Start: 2022-12-02 | End: 2023-07-06

## 2022-12-02 NOTE — TELEPHONE ENCOUNTER
Rizatriptan (Maxalt) 10 MG tablet   Last Written Prescription Date:  5/5/22  Last Fill Quantity: 18,   # refills: 5  Last Office Visit: 1/24/22  Future Office visit:    Next 5 appointments (look out 90 days)    Dec 13, 2022 10:30 AM  (Arrive by 10:15 AM)  SHORT with Dary Goddard CNP  [facility] ([facility] ) [address]  [facility] [address]  [phone]

## 2022-12-08 DIAGNOSIS — N91.2 ABSENCE OF MENSTRUATION: ICD-10-CM

## 2022-12-08 NOTE — TELEPHONE ENCOUNTER
Biest      Last Written Prescription Date:  9.9.22  Last Fill Quantity: #90,   # refills: 0  Last Office Visit: 1.24.22  Future Office visit:    Next 5 appointments (look out 90 days)    Dec 13, 2022 10:30 AM  (Arrive by 10:15 AM)  SHORT with Dary Goddard CNP  [facility] ([facility] ) [address]  [facility] [address]  [phone]           Routing refill request to provider for review/approval because:  Drug not on the [facility], [facility] or [facility] refill protocol or controlled substance

## 2022-12-12 NOTE — PROGRESS NOTES
Assessment & Plan          Hyperlipidemia LDL goal <100  - Lipid Profile (Chol, Trig, HDL, LDL calc)  - Comprehensive metabolic panel  - TSH with free T4 reflex    Migraine without aura and without status migrainosus, not intractable  - Continue plan of care    Primary insomnia  - Continue plan of care    Anxiety  - Continue plan of care  - Ativan refilled        Return in about 6 months (around 6/13/2023).         Dary Goddard, CNP  [facility] - [place] Acevedo is a 56 year old, presenting for the following health issues:  Lipids, Anxiety, and Headache        Hyperlipidemia Follow-Up    Are you regularly taking any medication or supplement to lower your cholesterol?   No    Are you having muscle aches or other side effects that you think could be caused by your cholesterol lowering medication?  No        Anxiety Follow-Up    How are you doing with your anxiety since your last visit? Improved some    Are you having other symptoms that might be associated with anxiety? No    Have you had a significant life event? Grief or Loss     Are you feeling depressed? No    Do you have any concerns with your use of alcohol or other drugs? No         Social History     Tobacco Use     Smoking status: Never     Smokeless tobacco: Never   Substance Use Topics     Alcohol use: Yes     Drug use: No         SARAVANAN-7 SCORE 3/26/2021 1/19/2022 12/13/2022   Total Score 0 0 2         PHQ 3/26/2021 1/19/2022 12/13/2022   PHQ-9 Total Score 0 0 3   Q9: Thoughts of better off dead/self-harm past 2 weeks Not at all Not at all Not at all         Last PHQ-9 12/13/2022   1.  Little interest or pleasure in doing things 0   2.  Feeling down, depressed, or hopeless 0   3.  Trouble falling or staying asleep, or sleeping too much 1   4.  Feeling tired or having little energy 1   5.  Poor appetite or overeating 1   6.  Feeling bad about yourself 0   7.  Trouble concentrating 0   8.  Moving slowly or restless 0   Q9: Thoughts of better  off dead/self-harm past 2 weeks 0   PHQ-9 Total Score 3   Difficulty at work, home, or with people Not difficult at all         SARAVANAN-7  12/13/2022   1. Feeling nervous, anxious, or on edge 0   2. Not being able to stop or control worrying 1   3. Worrying too much about different things 0   4. Trouble relaxing 1   5. Being so restless that it is hard to sit still 0   6. Becoming easily annoyed or irritable 0   7. Feeling afraid, as if something awful might happen 0   SARAVANAN-7 Total Score 2   If you checked any problems, how difficult have they made it for you to do your work, take care of things at home, or get along with other people? Not difficult at all         Migraine     Since your last clinic visit, how have your headaches changed?  Improved    How often are you getting headaches or migraines? Once a week     Are you able to do normal daily activities when you have a migraine? no    Are you taking rescue/relief medications? (Select all that apply) Maxalt    How helpful is your rescue/relief medication?  I get total relief    Are you taking any medications to prevent migraines? (Select all that apply)  No    In the past 4 weeks, how often have you gone to urgent care or the emergency room because of your headaches?  0        Patient Active Problem List   Diagnosis     Primary insomnia     Surgical menopause     H/O cold sores     Hyperlipidemia LDL goal <100     Migraine without aura and without status migrainosus, not intractable     Anxiety     Past Surgical History:   Procedure Laterality Date     DILATION AND CURETTAGE      x 2     GYN SURGERY      endometerial tiissue removed x's 2     HC TOOTH EXTRACTION W/FORCEP       HYSTERECTOMY TOTAL ABDOMINAL N/A 01/01/2000     HYSTEROSCOPY       MAMMOPLASTY REDUCTION BILATERAL      breast reduction       Social History     Tobacco Use     Smoking status: Never     Smokeless tobacco: Never   Substance Use Topics     Alcohol use: Yes     Family History   Problem Relation  Age of Onset     Cancer - colorectal Paternal Grandfather      Alzheimer Disease Paternal Grandfather              Current Outpatient Medications   Medication Sig Dispense Refill     COMPOUND CONTAINING CONTROLLED SUBSTANCE (CMPD RX) - PHARMACY TO MIX COMPOUNDED MEDICATION BIEST 80:20/PROGESTERONE/TESTOSTERONE 0.75/40/0.25MG ALVIN 90 each 0     LORazepam (ATIVAN) 1 MG tablet TAKE 1/2 TO 1 TABLET BY MOUTH EVERY 8 HOURS AS NEEDED Strength: 1 mg 90 tablet 1     multivitamin, therapeutic (THERA-VIT) TABS Take 1 tablet by mouth daily       rizatriptan (MAXALT) 10 MG tablet TAKE 1 TABLET BY MOUTH AT ONSET OF HEADACHE FOR MIGRAINE 18 tablet 5     valACYclovir (VALTREX) 500 MG tablet Take 1 tablet (500 mg) by mouth 2 times daily 30 tablet 3         Allergies   Allergen Reactions     Penicillins Hives         Recent Labs   Lab Test 01/24/22  1043 03/26/21  1039 02/20/19  0825 02/23/18  0938   LDL  --  151* 134* 125*   HDL  --  64 73 60   TRIG  --  130 111 120   ALT 53* 63* 26  --    CR 1.02 0.96 0.93 0.94   GFRESTIMATED 65 68 71 63   GFRESTBLACK  --  78 82 76   POTASSIUM 3.7 3.8 4.1 3.8   TSH  --  2.09 2.80 2.38          BP Readings from Last 3 Encounters:   12/13/22 130/80   01/24/22 130/70   01/19/22 130/82    Wt Readings from Last 3 Encounters:   12/13/22 79 kg (174 lb 3.2 oz)   01/24/22 74.4 kg (164 lb)   01/19/22 76.8 kg (169 lb 4.8 oz)              Review of Systems   Constitutional, HEENT, cardiovascular, pulmonary, gi and gu systems are negative, except as otherwise noted.            Objective    /80 (BP Location: Left arm, Patient Position: Sitting, Cuff Size: Adult Regular)   Pulse 96   Temp 97.8  F (36.6  C) (Tympanic)   Resp 16   Wt 79 kg (174 lb 3.2 oz)   SpO2 96%   BMI 27.28 kg/m    Body mass index is 27.28 kg/m .         Physical Exam   GENERAL: healthy, alert and no distress  EYES: Eyes grossly normal to inspection, PERRL and conjunctivae and sclerae normal  HENT: ear canals and TM's normal, nose  and mouth without ulcers or lesions  NECK: no adenopathy, no asymmetry, masses, or scars and thyroid normal to palpation  RESP: lungs clear to auscultation - no rales, rhonchi or wheezes  CV: regular rate and rhythm, normal S1 S2, no S3 or S4, no murmur, click or rub, no peripheral edema and peripheral pulses strong  SKIN: no suspicious lesions or rashes  PSYCH: mentation appears normal, affect normal/bright

## 2022-12-13 ENCOUNTER — OFFICE VISIT (OUTPATIENT)
Dept: FAMILY MEDICINE | Facility: OTHER | Age: 57
End: 2022-12-13
Attending: NURSE PRACTITIONER
Payer: COMMERCIAL

## 2022-12-13 VITALS
HEART RATE: 96 BPM | SYSTOLIC BLOOD PRESSURE: 130 MMHG | BODY MASS INDEX: 27.28 KG/M2 | RESPIRATION RATE: 16 BRPM | DIASTOLIC BLOOD PRESSURE: 80 MMHG | OXYGEN SATURATION: 96 % | WEIGHT: 174.2 LBS | TEMPERATURE: 97.8 F

## 2022-12-13 DIAGNOSIS — F41.9 ANXIETY: ICD-10-CM

## 2022-12-13 DIAGNOSIS — F51.01 PRIMARY INSOMNIA: ICD-10-CM

## 2022-12-13 DIAGNOSIS — G43.009 MIGRAINE WITHOUT AURA AND WITHOUT STATUS MIGRAINOSUS, NOT INTRACTABLE: ICD-10-CM

## 2022-12-13 DIAGNOSIS — E78.5 HYPERLIPIDEMIA LDL GOAL <100: Primary | ICD-10-CM

## 2022-12-13 LAB
ALBUMIN SERPL BCG-MCNC: 4.5 G/DL (ref 3.5–5.2)
ALP SERPL-CCNC: 67 U/L (ref 35–104)
ALT SERPL W P-5'-P-CCNC: 48 U/L (ref 10–35)
ANION GAP SERPL CALCULATED.3IONS-SCNC: 15 MMOL/L (ref 7–15)
AST SERPL W P-5'-P-CCNC: 34 U/L (ref 10–35)
BILIRUB SERPL-MCNC: 0.8 MG/DL
BUN SERPL-MCNC: 15.2 MG/DL (ref 6–20)
CALCIUM SERPL-MCNC: 9.3 MG/DL (ref 8.6–10)
CHLORIDE SERPL-SCNC: 102 MMOL/L (ref 98–107)
CHOLEST SERPL-MCNC: 243 MG/DL
CREAT SERPL-MCNC: 1.04 MG/DL (ref 0.51–0.95)
DEPRECATED HCO3 PLAS-SCNC: 22 MMOL/L (ref 22–29)
GFR SERPL CREATININE-BSD FRML MDRD: 64 ML/MIN/1.73M2
GLUCOSE SERPL-MCNC: 100 MG/DL (ref 70–99)
HDLC SERPL-MCNC: 68 MG/DL
HOLD SPECIMEN: NORMAL
LDLC SERPL CALC-MCNC: 152 MG/DL
NONHDLC SERPL-MCNC: 175 MG/DL
POTASSIUM SERPL-SCNC: 3.7 MMOL/L (ref 3.4–5.3)
PROT SERPL-MCNC: 7 G/DL (ref 6.4–8.3)
SODIUM SERPL-SCNC: 139 MMOL/L (ref 136–145)
TRIGL SERPL-MCNC: 117 MG/DL
TSH SERPL DL<=0.005 MIU/L-ACNC: 2.98 UIU/ML (ref 0.3–4.2)

## 2022-12-13 PROCEDURE — 36415 COLL VENOUS BLD VENIPUNCTURE: CPT | Performed by: NURSE PRACTITIONER

## 2022-12-13 PROCEDURE — 99214 OFFICE O/P EST MOD 30 MIN: CPT | Performed by: NURSE PRACTITIONER

## 2022-12-13 PROCEDURE — 80053 COMPREHEN METABOLIC PANEL: CPT | Performed by: NURSE PRACTITIONER

## 2022-12-13 PROCEDURE — 80061 LIPID PANEL: CPT | Performed by: NURSE PRACTITIONER

## 2022-12-13 PROCEDURE — 84443 ASSAY THYROID STIM HORMONE: CPT | Performed by: NURSE PRACTITIONER

## 2022-12-13 RX ORDER — LORAZEPAM 1 MG/1
TABLET ORAL
Qty: 90 TABLET | Refills: 1 | Status: SHIPPED | OUTPATIENT
Start: 2022-12-13 | End: 2023-04-07

## 2022-12-13 ASSESSMENT — ANXIETY QUESTIONNAIRES
7. FEELING AFRAID AS IF SOMETHING AWFUL MIGHT HAPPEN: NOT AT ALL
IF YOU CHECKED OFF ANY PROBLEMS ON THIS QUESTIONNAIRE, HOW DIFFICULT HAVE THESE PROBLEMS MADE IT FOR YOU TO DO YOUR WORK, TAKE CARE OF THINGS AT HOME, OR GET ALONG WITH OTHER PEOPLE: NOT DIFFICULT AT ALL
3. WORRYING TOO MUCH ABOUT DIFFERENT THINGS: NOT AT ALL
5. BEING SO RESTLESS THAT IT IS HARD TO SIT STILL: NOT AT ALL
GAD7 TOTAL SCORE: 2
4. TROUBLE RELAXING: SEVERAL DAYS
2. NOT BEING ABLE TO STOP OR CONTROL WORRYING: SEVERAL DAYS
1. FEELING NERVOUS, ANXIOUS, OR ON EDGE: NOT AT ALL
6. BECOMING EASILY ANNOYED OR IRRITABLE: NOT AT ALL
GAD7 TOTAL SCORE: 2

## 2022-12-13 ASSESSMENT — PATIENT HEALTH QUESTIONNAIRE - PHQ9: SUM OF ALL RESPONSES TO PHQ QUESTIONS 1-9: 3

## 2022-12-13 ASSESSMENT — PAIN SCALES - GENERAL: PAINLEVEL: NO PAIN (0)

## 2022-12-13 NOTE — PATIENT INSTRUCTIONS
Assessment & Plan          Hyperlipidemia LDL goal <100  - Lipid Profile (Chol, Trig, HDL, LDL calc)  - Comprehensive metabolic panel  - TSH with free T4 reflex      Migraine without aura and without status migrainosus, not intractable  - Continue plan of care      Primary insomnia  - Continue plan of care      Anxiety  - Continue plan of care  - Ativan refilled        Return in about 6 months (around 6/13/2023).         Dary Goddard, CNP  [facility] [place] [facility]

## 2022-12-14 NOTE — RESULT ENCOUNTER NOTE
Stable labs      The 10-year ASCVD risk score (Cat VICKERS, et al., 2019) is: 1.9%    Values used to calculate the score:      Age: 56 years      Sex: Female      Is Non- : No      Diabetic: No      Tobacco smoker: No      Systolic Blood Pressure: 130 mmHg      Is BP treated: No      HDL Cholesterol: 68 mg/dL      Total Cholesterol: 243 mg/dL

## 2022-12-23 ENCOUNTER — ANCILLARY PROCEDURE (OUTPATIENT)
Dept: MAMMOGRAPHY | Facility: OTHER | Age: 57
End: 2022-12-23
Attending: NURSE PRACTITIONER
Payer: COMMERCIAL

## 2022-12-23 ENCOUNTER — TELEPHONE (OUTPATIENT)
Dept: MAMMOGRAPHY | Facility: OTHER | Age: 57
End: 2022-12-23

## 2022-12-23 DIAGNOSIS — Z12.31 ENCOUNTER FOR SCREENING MAMMOGRAM FOR MALIGNANT NEOPLASM OF BREAST: ICD-10-CM

## 2022-12-23 PROCEDURE — 77067 SCR MAMMO BI INCL CAD: CPT | Mod: TC | Performed by: RADIOLOGY

## 2022-12-23 PROCEDURE — 77063 BREAST TOMOSYNTHESIS BI: CPT | Mod: TC | Performed by: RADIOLOGY

## 2023-01-07 ENCOUNTER — HEALTH MAINTENANCE LETTER (OUTPATIENT)
Age: 58
End: 2023-01-07

## 2023-03-17 DIAGNOSIS — N91.2 ABSENCE OF MENSTRUATION: ICD-10-CM

## 2023-03-17 NOTE — TELEPHONE ENCOUNTER
Biest      Last Written Prescription Date:  12.8.22  Last Fill Quantity: #90,   # refills: 0  Last Office Visit: 12.13.22  Future Office visit:       Routing refill request to provider for review/approval because:  Drug not on the [facility], P or [facility] refill protocol or controlled substance

## 2023-03-20 DIAGNOSIS — N91.2 ABSENCE OF MENSTRUATION: ICD-10-CM

## 2023-03-27 NOTE — LETTER
My Migraine Action Plan      Date: 2/20/2019     My Name: Kristina Michael   YOB: 1965  My Pharmacy:    [facility] [place]  [facility] [id number] [facility] [address]  [address] [facility] [address]  [facility] [place] [address] [facility]. [facility] [place] [facility] [place]       My (Preventative) Control Medicine: no        My Rescue Medicine: Maxalt   My Doctor: Dary Goddard     My Clinic: [facility]  [address]  [facility] [address]  [phone]        GREEN ZONE = Good Control    My headache plan is working.   I can do what I need to do.           I WILL:     ? Keep managing my triggers.  ? Write in my migraine diary each time I have a headache.  ? Keep taking my preventive (controller) medicine daily.  ? Take my relief and rescue medicine as needed.             YELLOW ZONE = Not Enough Control    My headache plan isn t always working.   My headaches keep me from doing   some of the things I need to do.       I WILL:     ? Set goals to control my triggers and act on them.  ? Write in my migraine diary each time I have a headache and review it for                      patterns or new triggers.  ? Keep taking my preventive (controller) medicine daily.  ? Take my relief and rescue medicine as needed.  ? Call my doctor or clinic at if I stay in the Yellow Zone.             RED ZONE = Poor or No Control    My headache plan has  failed. I can t do anything  when I have one. My  medicines aren t working.           I WILL:   ? Set goals to control my triggers and act on them.  ? Write in my migraine diary each time I have a headache and review it for                      patterns or new triggers.  ? Keep taking my preventive (controller) medicine daily.  ? Take my relief and rescue medicine as needed.  ? Call my doctor or clinic or go to urgent care or an ER if I m having the worst                   headache of my life.  ? Call my doctor or clinic or go to urgent care or an ER if my medicine doesn t work.  ? Let my doctor or clinic know within 2 weeks if I have gone to an urgent care or             emergency department.               Yes

## 2023-04-05 DIAGNOSIS — F41.9 ANXIETY: ICD-10-CM

## 2023-04-07 RX ORDER — LORAZEPAM 1 MG/1
TABLET ORAL
Qty: 90 TABLET | Refills: 0 | Status: SHIPPED | OUTPATIENT
Start: 2023-04-07 | End: 2023-05-08

## 2023-04-07 NOTE — TELEPHONE ENCOUNTER
Ativan      Last Written Prescription Date:  2.24.23  Last Fill Quantity: #90,   # refills: 0  Last Office Visit: 12.13.22  Future Office visit:       Routing refill request to provider for review/approval because:  Drug not on the [facility], P or [facility] refill protocol or controlled substance

## 2023-05-08 DIAGNOSIS — F41.9 ANXIETY: ICD-10-CM

## 2023-05-08 RX ORDER — LORAZEPAM 1 MG/1
TABLET ORAL
Qty: 90 TABLET | Refills: 0 | Status: SHIPPED | OUTPATIENT
Start: 2023-05-08 | End: 2023-06-09

## 2023-05-08 NOTE — TELEPHONE ENCOUNTER
Ativan      Last Written Prescription Date:  4.7.23  Last Fill Quantity: #90,   # refills: 0  Last Office Visit: 12.13.22  Future Office visit:       Routing refill request to provider for review/approval because:  Drug not on the [facility], P or [facility] refill protocol or controlled substance

## 2023-06-08 DIAGNOSIS — F41.9 ANXIETY: ICD-10-CM

## 2023-06-09 RX ORDER — LORAZEPAM 1 MG/1
TABLET ORAL
Qty: 90 TABLET | Refills: 0 | Status: SHIPPED | OUTPATIENT
Start: 2023-06-09 | End: 2023-07-06

## 2023-06-09 NOTE — TELEPHONE ENCOUNTER
Ativan      Last Written Prescription Date:  5.8.23  Last Fill Quantity: #90,   # refills: 0  Last Office Visit: 12.13.22  Future Office visit:       Routing refill request to provider for review/approval because:  Drug not on the [facility], P or [facility] refill protocol or controlled substance

## 2023-07-05 DIAGNOSIS — F41.9 ANXIETY: ICD-10-CM

## 2023-07-05 DIAGNOSIS — G43.009 MIGRAINE WITHOUT AURA AND WITHOUT STATUS MIGRAINOSUS, NOT INTRACTABLE: ICD-10-CM

## 2023-07-05 NOTE — Clinical Note
Not sure how many you want to give, patient was due for follow-up in June according to your last note

## 2023-07-06 RX ORDER — RIZATRIPTAN BENZOATE 10 MG/1
TABLET ORAL
Qty: 18 TABLET | Refills: 5 | Status: SHIPPED | OUTPATIENT
Start: 2023-07-06 | End: 2024-02-27

## 2023-07-06 NOTE — TELEPHONE ENCOUNTER
rizatriptan (MAXALT) 10 MG tablet 18 tablet 5 12/2/2022     Last Office Visit: 12.13.22  Future Office visit:       Routing refill request to provider for review/approval because:

## 2023-07-06 NOTE — TELEPHONE ENCOUNTER
LORazepam (ATIVAN) 1 MG tablet 90 tablet 0 6/9/2023     Last Office Visit: 12.13.22  *Future Office visit:       Routing refill request to provider for review/approval because:

## 2023-07-07 NOTE — TELEPHONE ENCOUNTER
Please schedule patient for office visit with Dary, then please route task back to me to approve refill once appointment scheduled.

## 2023-07-17 ENCOUNTER — TELEPHONE (OUTPATIENT)
Dept: FAMILY MEDICINE | Facility: OTHER | Age: 58
End: 2023-07-17

## 2023-07-17 NOTE — TELEPHONE ENCOUNTER
Because it is a controlled substance, and those are the JARED requirements.    [facility]  [phone]

## 2023-07-17 NOTE — TELEPHONE ENCOUNTER
11:17 AM    Reason for Call: Phone Call    Description: Patient called in wondering why she would need to be seen by her PCP if she sees her once a year for a medication review and nothing has really changed. Please call patient back.    Was an appointment offered for this call? no  If yes : Appointment type              Date    Preferred method for responding to this message: Telephone Call  What is your phone number ? [phone]    If we cannot reach you directly, may we leave a detailed response at the number you provided? Yes    Can this message wait until your PCP/provider returns, if available today? Not applicable, PROVIDER IN CLINIC TODAY    Rama Amaya

## 2023-07-17 NOTE — TELEPHONE ENCOUNTER
Attempt # 3  Outcome: Letter sent - max attempts reached   Comment: sent letter advising pt due for med review with pcp

## 2023-07-18 RX ORDER — LORAZEPAM 1 MG/1
TABLET ORAL
Qty: 90 TABLET | Refills: 0 | Status: SHIPPED | OUTPATIENT
Start: 2023-07-18 | End: 2023-08-09

## 2023-08-07 PROBLEM — R10.2 PELVIC PAIN: Status: ACTIVE | Noted: 2022-02-03

## 2023-08-07 NOTE — PROGRESS NOTES
Assessment & Plan         Migraine without aura and without status migrainosus, not intractable  - Continue plan of care      Hyperlipidemia, unspecified hyperlipidemia type  - Low fat diet      Anxiety  - LORazepam (ATIVAN) 1 MG tablet; TAKE 1/2 TO 1 TABLET BY MOUTH EVERY 8 HOURS AS NEEDED      Encounter for screening mammogram for malignant neoplasm of breast  - MA Screen Bilateral w/Venancio; Future        Dary Goddard, CNP  [facility] - [place] Acevedo is a 57 year old, presenting for the following health issues:  Lipids and Headache        Hyperlipidemia Follow-Up  Are you regularly taking any medication or supplement to lower your cholesterol?   No  Are you having muscle aches or other side effects that you think could be caused by your cholesterol lowering medication?  No        Migraine   Since your last clinic visit, how have your headaches changed?  Improved  How often are you getting headaches or migraines? 2-4 a month   Are you able to do normal daily activities when you have a migraine? Yes  Are you taking rescue/relief medications? (Select all that apply) Maxalt  How helpful is your rescue/relief medication?  I get total relief  Are you taking any medications to prevent migraines? (Select all that apply)  No  In the past 4 weeks, how often have you gone to urgent care or the emergency room because of your headaches?  0        Anxiety Follow-Up  How are you doing with your anxiety since your last visit? No change  Are you having other symptoms that might be associated with anxiety? No  Have you had a significant life event? No   Are you feeling depressed? No  Do you have any concerns with your use of alcohol or other drugs? No        Social History     Tobacco Use    Smoking status: Never    Smokeless tobacco: Never   Vaping Use    Vaping Use: Never used   Substance Use Topics    Alcohol use: Yes    Drug use: No             1/19/2022    10:10 AM 12/13/2022    10:35 AM 8/9/2023    11:34  AM   SARAVANAN-7 SCORE   Total Score 0 2 0             3/26/2021     9:00 AM 1/19/2022    10:10 AM 12/13/2022    10:00 AM   PHQ   PHQ-9 Total Score 0 0 3   Q9: Thoughts of better off dead/self-harm past 2 weeks Not at all Not at all Not at all         Patient Active Problem List   Diagnosis    Primary insomnia    Surgical menopause    H/O cold sores    Hyperlipidemia    Migraine without aura    Anxiety    Pelvic pain     Past Surgical History:   Procedure Laterality Date    DILATION AND CURETTAGE      x 2    GYN SURGERY      endometerial tiissue removed x's 2    HC TOOTH EXTRACTION W/FORCEP      HYSTERECTOMY TOTAL ABDOMINAL N/A 01/01/2000    HYSTEROSCOPY      MAMMOPLASTY REDUCTION BILATERAL      breast reduction       Social History     Tobacco Use    Smoking status: Never    Smokeless tobacco: Never   Substance Use Topics    Alcohol use: Yes     Family History   Problem Relation Age of Onset    Cancer - colorectal Paternal Grandfather     Alzheimer Disease Paternal Grandfather              Current Outpatient Medications   Medication Sig Dispense Refill    COMPOUND CONTAINING CONTROLLED SUBSTANCE (CMPD RX) - PHARMACY TO MIX COMPOUNDED MEDICATION BIEST 80:20/PROGESTERONE/TESTOSTERONE 0.75/40/0.25MG ALVIN 90 each 0    LORazepam (ATIVAN) 1 MG tablet TAKE 1/2 TO 1 TABLET BY MOUTH EVERY 8 HOURS AS NEEDED 90 tablet 0    multivitamin, therapeutic (THERA-VIT) TABS Take 1 tablet by mouth daily      rizatriptan (MAXALT) 10 MG tablet TAKE 1 TABLET BY MOUTH AT ONSET OF HEADACHE FOR MIGRAINE 18 tablet 5    valACYclovir (VALTREX) 500 MG tablet Take 1 tablet (500 mg) by mouth 2 times daily 30 tablet 3         Allergies   Allergen Reactions    Penicillins Hives         Recent Labs   Lab Test 12/13/22  1043 01/24/22  1043 03/26/21  1039 02/20/19  0825   *  --  151* 134*   HDL 68  --  64 73   TRIG 117  --  130 111   ALT 48* 53* 63* 26   CR 1.04* 1.02 0.96 0.93   GFRESTIMATED 64 65 68 71   GFRESTBLACK  --   --  78 82   POTASSIUM 3.7  3.7 3.8 4.1   TSH 2.98  --  2.09 2.80          BP Readings from Last 3 Encounters:   08/09/23 (!) 134/98   12/13/22 130/80   01/24/22 130/70    Wt Readings from Last 3 Encounters:   12/13/22 79 kg (174 lb 3.2 oz)   01/24/22 74.4 kg (164 lb)   01/19/22 76.8 kg (169 lb 4.8 oz)                 Review of Systems   Constitutional, HEENT, cardiovascular, pulmonary, gi and gu systems are negative, except as otherwise noted.          Objective    BP (!) 134/98 (BP Location: Left arm, Patient Position: Sitting, Cuff Size: Adult Regular)   Pulse 77   Temp 97.6  F (36.4  C) (Tympanic)   Resp 18   SpO2 98%   There is no height or weight on file to calculate BMI.        Physical Exam   GENERAL: healthy, alert and no distress  NECK: no adenopathy, no asymmetry, masses, or scars and thyroid normal to palpation  RESP: lungs clear to auscultation - no rales, rhonchi or wheezes  CV: regular rate and rhythm, normal S1 S2, no S3 or S4, no murmur, click or rub, no peripheral edema and peripheral pulses strong  ABDOMEN: soft, nontender, no hepatosplenomegaly, no masses and bowel sounds normal  MS: no gross musculoskeletal defects noted, no edema  NEURO: Normal strength and tone, mentation intact and speech normal  PSYCH: mentation appears normal, affect normal/bright        The 10-year ASCVD risk score (Cat VICKERS, et al., 2019) is: 2.2%    Values used to calculate the score:      Age: 57 years      Sex: Female      Is Non- : No      Diabetic: No      Tobacco smoker: No      Systolic Blood Pressure: 134 mmHg      Is BP treated: No      HDL Cholesterol: 68 mg/dL      Total Cholesterol: 243 mg/dL         Patient is seen in conjunction with NP student.  History is reviewed with patient and pertinent portions of the exam are repeated.  Assessment and plan is reviewed with the patient.       Michelle Sutherland, NP Student

## 2023-08-09 ENCOUNTER — OFFICE VISIT (OUTPATIENT)
Dept: FAMILY MEDICINE | Facility: OTHER | Age: 58
End: 2023-08-09
Attending: NURSE PRACTITIONER
Payer: COMMERCIAL

## 2023-08-09 VITALS
SYSTOLIC BLOOD PRESSURE: 134 MMHG | RESPIRATION RATE: 18 BRPM | OXYGEN SATURATION: 98 % | DIASTOLIC BLOOD PRESSURE: 98 MMHG | HEART RATE: 77 BPM | TEMPERATURE: 97.6 F

## 2023-08-09 DIAGNOSIS — E78.5 HYPERLIPIDEMIA, UNSPECIFIED HYPERLIPIDEMIA TYPE: ICD-10-CM

## 2023-08-09 DIAGNOSIS — G43.009 MIGRAINE WITHOUT AURA AND WITHOUT STATUS MIGRAINOSUS, NOT INTRACTABLE: Primary | ICD-10-CM

## 2023-08-09 DIAGNOSIS — Z12.31 ENCOUNTER FOR SCREENING MAMMOGRAM FOR MALIGNANT NEOPLASM OF BREAST: ICD-10-CM

## 2023-08-09 DIAGNOSIS — F41.9 ANXIETY: ICD-10-CM

## 2023-08-09 PROCEDURE — 99214 OFFICE O/P EST MOD 30 MIN: CPT | Performed by: NURSE PRACTITIONER

## 2023-08-09 RX ORDER — LORAZEPAM 1 MG/1
TABLET ORAL
Qty: 90 TABLET | Refills: 5 | Status: SHIPPED | OUTPATIENT
Start: 2023-08-09 | End: 2023-09-05

## 2023-08-09 ASSESSMENT — ANXIETY QUESTIONNAIRES
5. BEING SO RESTLESS THAT IT IS HARD TO SIT STILL: NOT AT ALL
GAD7 TOTAL SCORE: 0
1. FEELING NERVOUS, ANXIOUS, OR ON EDGE: NOT AT ALL
7. FEELING AFRAID AS IF SOMETHING AWFUL MIGHT HAPPEN: NOT AT ALL
2. NOT BEING ABLE TO STOP OR CONTROL WORRYING: NOT AT ALL
3. WORRYING TOO MUCH ABOUT DIFFERENT THINGS: NOT AT ALL
6. BECOMING EASILY ANNOYED OR IRRITABLE: NOT AT ALL
GAD7 TOTAL SCORE: 0
4. TROUBLE RELAXING: NOT AT ALL

## 2023-08-09 ASSESSMENT — PAIN SCALES - GENERAL: PAINLEVEL: NO PAIN (0)

## 2023-08-09 NOTE — PATIENT INSTRUCTIONS
Assessment & Plan         Migraine without aura and without status migrainosus, not intractable  - Continue plan of care      Hyperlipidemia, unspecified hyperlipidemia type  - Low fat diet      Anxiety  - LORazepam (ATIVAN) 1 MG tablet; TAKE 1/2 TO 1 TABLET BY MOUTH EVERY 8 HOURS AS NEEDED      Encounter for screening mammogram for malignant neoplasm of breast  - MA Screen Bilateral w/Venancio; Future        Dary Goddard, CNP  [facility] [place] [facility]

## 2023-09-05 DIAGNOSIS — F41.9 ANXIETY: ICD-10-CM

## 2023-09-05 RX ORDER — LORAZEPAM 1 MG/1
TABLET ORAL
Qty: 90 TABLET | Refills: 1 | Status: SHIPPED | OUTPATIENT
Start: 2023-09-05 | End: 2024-02-27

## 2023-09-07 DIAGNOSIS — N91.2 ABSENCE OF MENSTRUATION: ICD-10-CM

## 2023-09-07 NOTE — TELEPHONE ENCOUNTER
Biest      Last Written Prescription Date:  6.13.23  Last Fill Quantity: #90,   # refills: 0  Last Office Visit: 8.9.23  Future Office visit:       Routing refill request to provider for review/approval because:  Drug not on the [facility], [facility] or [facility] refill protocol or controlled substance

## 2023-12-13 DIAGNOSIS — N91.2 ABSENCE OF MENSTRUATION: ICD-10-CM

## 2023-12-14 DIAGNOSIS — N91.2 ABSENCE OF MENSTRUATION: ICD-10-CM

## 2023-12-14 NOTE — TELEPHONE ENCOUNTER
RX was local printed yesterday.  Unsure if it went to pharmacy.  New RX pended to [facility].

## 2024-01-16 ENCOUNTER — ANCILLARY PROCEDURE (OUTPATIENT)
Dept: MAMMOGRAPHY | Facility: OTHER | Age: 59
End: 2024-01-16
Attending: NURSE PRACTITIONER
Payer: COMMERCIAL

## 2024-01-16 DIAGNOSIS — Z12.31 ENCOUNTER FOR SCREENING MAMMOGRAM FOR MALIGNANT NEOPLASM OF BREAST: ICD-10-CM

## 2024-01-16 PROCEDURE — 77067 SCR MAMMO BI INCL CAD: CPT | Mod: TC | Performed by: RADIOLOGY

## 2024-01-16 PROCEDURE — 77063 BREAST TOMOSYNTHESIS BI: CPT | Mod: TC | Performed by: RADIOLOGY

## 2024-02-26 DIAGNOSIS — G43.009 MIGRAINE WITHOUT AURA AND WITHOUT STATUS MIGRAINOSUS, NOT INTRACTABLE: ICD-10-CM

## 2024-02-26 DIAGNOSIS — F41.9 ANXIETY: ICD-10-CM

## 2024-02-26 NOTE — TELEPHONE ENCOUNTER
Maxalt 10mg      Last Written Prescription Date:  7-6-23  Last Fill Quantity: 18,   # refills: 5  Last Office Visit: 8-9-23  Future Office visit:       Routing refill request to provider for review/approval because:  Drug not on the [facility], [facility] or [facility] refill protocol or controlled substance

## 2024-02-26 NOTE — TELEPHONE ENCOUNTER
Ativan      Last Written Prescription Date:  9-5-23  Last Fill Quantity: 90,   # refills: 1  Last Office Visit: 8-9-23  Future Office visit:       Routing refill request to provider for review/approval because:  Drug not on the [facility], P or [facility] refill protocol or controlled substance

## 2024-02-27 RX ORDER — RIZATRIPTAN BENZOATE 10 MG/1
TABLET ORAL
Qty: 18 TABLET | Refills: 5 | Status: SHIPPED | OUTPATIENT
Start: 2024-02-27

## 2024-02-27 RX ORDER — LORAZEPAM 1 MG/1
TABLET ORAL
Qty: 90 TABLET | Refills: 0 | Status: SHIPPED | OUTPATIENT
Start: 2024-02-27 | End: 2024-04-01

## 2024-02-27 NOTE — TELEPHONE ENCOUNTER
Routing refill request to provider for review/approval because:    Drug not on the [facility], [facility] or [facility] refill protocol or controlled substance     Spoke with patient who has the MRI scheduled.   She did not get the message about seeing Dr. Huynh.  She states the her therapist told her to see Dr. Chapman.  Pt would like this clarified with Dr. Buchanan

## 2024-02-27 NOTE — TELEPHONE ENCOUNTER
Serotonin Agonists [id number]/26/2024 10:25 AM   Protocol Details Blood pressure under 140/90 in past 12 months    Serotonin Agonist request needs review.     BP Readings from Last 3 Encounters:   08/09/23 (!) 134/98   12/13/22 130/80   01/24/22 130/70

## 2024-03-19 DIAGNOSIS — D22.9 NUMEROUS MOLES: Primary | ICD-10-CM

## 2024-03-19 NOTE — PROGRESS NOTES
Requests a skin check due to moles - dermatology      Dary Goddard [facility]  [phone]

## 2024-04-01 DIAGNOSIS — F41.9 ANXIETY: ICD-10-CM

## 2024-04-01 RX ORDER — LORAZEPAM 1 MG/1
TABLET ORAL
Qty: 90 TABLET | Refills: 0 | Status: SHIPPED | OUTPATIENT
Start: 2024-04-01 | End: 2024-04-22

## 2024-04-01 NOTE — TELEPHONE ENCOUNTER
Routing refill request to provider for review/approval because:  Drug not on the [facility], [facility] or [facility] refill protocol or controlled substance

## 2024-04-01 NOTE — TELEPHONE ENCOUNTER
Lorazepam  Last Written Prescription Date: 2/27/24  Last Fill Quantity: 90 # of Refills: 0  Last Office Visit: 8/9/23

## 2024-04-22 ENCOUNTER — OFFICE VISIT (OUTPATIENT)
Dept: FAMILY MEDICINE | Facility: OTHER | Age: 59
End: 2024-04-22
Attending: NURSE PRACTITIONER
Payer: COMMERCIAL

## 2024-04-22 VITALS
WEIGHT: 167 LBS | DIASTOLIC BLOOD PRESSURE: 90 MMHG | TEMPERATURE: 98.3 F | RESPIRATION RATE: 18 BRPM | HEART RATE: 98 BPM | OXYGEN SATURATION: 95 % | BODY MASS INDEX: 26.16 KG/M2 | SYSTOLIC BLOOD PRESSURE: 130 MMHG

## 2024-04-22 DIAGNOSIS — Z86.19 H/O COLD SORES: ICD-10-CM

## 2024-04-22 DIAGNOSIS — L40.9 PSORIASIS: Primary | ICD-10-CM

## 2024-04-22 DIAGNOSIS — L29.9 ITCHING: ICD-10-CM

## 2024-04-22 DIAGNOSIS — F41.9 ANXIETY: ICD-10-CM

## 2024-04-22 DIAGNOSIS — G43.009 MIGRAINE WITHOUT AURA AND WITHOUT STATUS MIGRAINOSUS, NOT INTRACTABLE: ICD-10-CM

## 2024-04-22 PROCEDURE — 99214 OFFICE O/P EST MOD 30 MIN: CPT | Performed by: NURSE PRACTITIONER

## 2024-04-22 RX ORDER — LORAZEPAM 1 MG/1
TABLET ORAL
Qty: 90 TABLET | Refills: 0 | Status: SHIPPED | OUTPATIENT
Start: 2024-04-22 | End: 2024-06-12

## 2024-04-22 RX ORDER — VALACYCLOVIR HYDROCHLORIDE 500 MG/1
500 TABLET, FILM COATED ORAL 2 TIMES DAILY
Qty: 30 TABLET | Refills: 3 | Status: SHIPPED | OUTPATIENT
Start: 2024-04-22

## 2024-04-22 RX ORDER — HYDROXYZINE HYDROCHLORIDE 25 MG/1
25 TABLET, FILM COATED ORAL 3 TIMES DAILY PRN
Qty: 60 TABLET | Refills: 0 | Status: SHIPPED | OUTPATIENT
Start: 2024-04-22 | End: 2024-08-28

## 2024-04-22 RX ORDER — CLOBETASOL PROPIONATE 0.5 MG/ML
SOLUTION TOPICAL 2 TIMES DAILY
Qty: 150 ML | Refills: 2 | Status: SHIPPED | OUTPATIENT
Start: 2024-04-22

## 2024-04-22 ASSESSMENT — ANXIETY QUESTIONNAIRES
6. BECOMING EASILY ANNOYED OR IRRITABLE: SEVERAL DAYS
8. IF YOU CHECKED OFF ANY PROBLEMS, HOW DIFFICULT HAVE THESE MADE IT FOR YOU TO DO YOUR WORK, TAKE CARE OF THINGS AT HOME, OR GET ALONG WITH OTHER PEOPLE?: NOT DIFFICULT AT ALL
7. FEELING AFRAID AS IF SOMETHING AWFUL MIGHT HAPPEN: NOT AT ALL
2. NOT BEING ABLE TO STOP OR CONTROL WORRYING: SEVERAL DAYS
1. FEELING NERVOUS, ANXIOUS, OR ON EDGE: SEVERAL DAYS
4. TROUBLE RELAXING: SEVERAL DAYS
3. WORRYING TOO MUCH ABOUT DIFFERENT THINGS: SEVERAL DAYS
7. FEELING AFRAID AS IF SOMETHING AWFUL MIGHT HAPPEN: NOT AT ALL
GAD7 TOTAL SCORE: 6
5. BEING SO RESTLESS THAT IT IS HARD TO SIT STILL: SEVERAL DAYS
GAD7 TOTAL SCORE: 6
IF YOU CHECKED OFF ANY PROBLEMS ON THIS QUESTIONNAIRE, HOW DIFFICULT HAVE THESE PROBLEMS MADE IT FOR YOU TO DO YOUR WORK, TAKE CARE OF THINGS AT HOME, OR GET ALONG WITH OTHER PEOPLE: NOT DIFFICULT AT ALL

## 2024-04-22 ASSESSMENT — PAIN SCALES - GENERAL: PAINLEVEL: MODERATE PAIN (5)

## 2024-04-22 NOTE — PATIENT INSTRUCTIONS
Assessment & Plan         Psoriasis  - clobetasol (TEMOVATE) 0.05 % external solution; Apply topically 2 times daily  - [place] for bathing  - Benadryl as needed if hydroxyzine does not help  - Dermatology - Dr. Pacheco - 5/6/24 8:00 am in [place]      Itching  - hydrOXYzine HCl (ATARAX) 25 MG tablet; Take 1 tablet (25 mg) by mouth 3 times daily as needed for itching      Anxiety  - Continue plan of care      Migraine without aura and without status migrainosus, not intractable  - Continue plan of care        Please continue to take all medication as directed  Please notify your pharmacy or our refill line of future refills needed.  Please return sooner than your next scheduled appointment with any concerns.        When your lab results return, we will call you with abnormal findings  You can also view this information in your MyChart, if you have an account.  Please call or [employer] message us with any questions you may have.          Dary PARNELLBRITTANIE  [phone]

## 2024-04-22 NOTE — PROGRESS NOTES
Assessment & Plan         Psoriasis  - clobetasol (TEMOVATE) 0.05 % external solution; Apply topically 2 times daily  - [place] for bathing  - Benadryl as needed if hydroxyzine does not help  - Dermatology - Dr. Pacheco - 5/6/24 8:00 am in [place]        Itching  - hydrOXYzine HCl (ATARAX) 25 MG tablet; Take 1 tablet (25 mg) by mouth 3 times daily as needed for itching        Anxiety  - Continue plan of care        Migraine without aura and without status migrainosus, not intractable  - Continue plan of care          Please continue to take all medication as directed  Please notify your pharmacy or our refill line of future refills needed.  Please return sooner than your next scheduled appointment with any concerns.        When your lab results return, we will call you with abnormal findings  You can also view this information in your MyChart, if you have an account.  Please call or iWOPI message us with any questions you may have.          Dary Goddard [facility]  [phone]           Kristina is a 58 year old, presenting for the following health issues:  Derm Problem        Concern - Psoriasis   Onset: years, but worse last few months  Description: on elbow back, scalp   Intensity: severe  Progression of Symptoms:  worsening and constant  Accompanying Signs & Symptoms: joint pain in Right hand and Left shoulder  Previous history of similar problem: yes  Precipitating factors:        Worsened by: heat  Alleviating factors:        Improved by: none  Therapies tried and outcome: Cortizone cream           Anxiety   How are you doing with your anxiety since your last visit? Improved   Are you having other symptoms that might be associated with anxiety? No  Have you had a significant life event? No   Are you feeling depressed? No  Do you have any concerns with your use of alcohol or other drugs? No          Migraine headaches  Stable on Maxalt  No concerns          Social History     Tobacco Use    Smoking status: Never     Smokeless tobacco: Never   Vaping Use    Vaping status: Never Used   Substance Use Topics    Alcohol use: Yes    Drug use: No               12/13/2022    10:35 AM 8/9/2023    11:34 AM 4/22/2024    11:01 AM   SARAVANAN-7 SCORE   Total Score   6 (mild anxiety)   Total Score 2 0 6               3/26/2021     9:00 AM 1/19/2022    10:10 AM 12/13/2022    10:00 AM   PHQ   PHQ-9 Total Score 0 0 3   Q9: Thoughts of better off dead/self-harm past 2 weeks Not at all Not at all Not at all           Patient Active Problem List   Diagnosis    Primary insomnia    Surgical menopause    H/O cold sores    Hyperlipidemia    Migraine without aura    Anxiety    Pelvic pain     Past Surgical History:   Procedure Laterality Date    DILATION AND CURETTAGE      x 2    GYN SURGERY      endometerial tiissue removed x's 2    HC TOOTH EXTRACTION W/FORCEP      HYSTERECTOMY TOTAL ABDOMINAL N/A 01/01/2000    HYSTEROSCOPY      MAMMOPLASTY REDUCTION BILATERAL      breast reduction       Social History     Tobacco Use    Smoking status: Never    Smokeless tobacco: Never   Substance Use Topics    Alcohol use: Yes     Family History   Problem Relation Age of Onset    Cancer - colorectal Paternal Grandfather     Alzheimer Disease Paternal Grandfather              Current Outpatient Medications   Medication Sig Dispense Refill    clobetasol (TEMOVATE) 0.05 % external solution Apply topically 2 times daily 150 mL 2    COMPOUND CONTAINING CONTROLLED SUBSTANCE (CMPD RX) - PHARMACY TO MIX COMPOUNDED MEDICATION BIEST 80:20/PROGESTERONE/TESTOSTERONE 0.75/40/0.25MG ALVIN 90 each 0    hydrOXYzine HCl (ATARAX) 25 MG tablet Take 1 tablet (25 mg) by mouth 3 times daily as needed for itching 60 tablet 0    LORazepam (ATIVAN) 1 MG tablet TAKE 1/2 TO 1 TABLET BY MOUTH EVERY 8 HOURS AS NEEDED 90 tablet 0    multivitamin, therapeutic (THERA-VIT) TABS Take 1 tablet by mouth daily      rizatriptan (MAXALT) 10 MG tablet TAKE 1 TABLET BY MOUTH AT ONSET OF HEADACHE FOR MIGRAINE  18 tablet 5    valACYclovir (VALTREX) 500 MG tablet Take 1 tablet (500 mg) by mouth 2 times daily 30 tablet 3         Allergies   Allergen Reactions    Penicillins Hives         Recent Labs   Lab Test 12/13/22  1043 01/24/22  1043 03/26/21  1039 02/20/19  0825   *  --  151* 134*   HDL 68  --  64 73   TRIG 117  --  130 111   ALT 48* 53* 63* 26   CR 1.04* 1.02 0.96 0.93   GFRESTIMATED 64 65 68 71   GFRESTBLACK  --   --  78 82   POTASSIUM 3.7 3.7 3.8 4.1   TSH 2.98  --  2.09 2.80          BP Readings from Last 3 Encounters:   04/22/24 (!) 130/90   08/09/23 (!) 134/98   12/13/22 130/80    Wt Readings from Last 3 Encounters:   04/22/24 75.8 kg (167 lb)   12/13/22 79 kg (174 lb 3.2 oz)   01/24/22 74.4 kg (164 lb)                     Review of Systems  Constitutional, neuro, ENT, endocrine, pulmonary, cardiac, gastrointestinal, genitourinary, musculoskeletal, integument and psychiatric systems are negative, except as otherwise noted.          Objective    BP (!) 130/90 (BP Location: Left arm, Patient Position: Sitting, Cuff Size: Adult Large)   Pulse 98   Temp 98.3  F (36.8  C) (Tympanic)   Resp 18   Wt 75.8 kg (167 lb)   SpO2 95%   Breastfeeding No   BMI 26.16 kg/m    Body mass index is 26.16 kg/m .          Physical Exam   GENERAL: alert and no distress  EYES: Eyes grossly normal to inspection, PERRL and conjunctivae and sclerae normal  HENT: ear canals and TM's normal, nose and mouth without ulcers or lesions  NECK: no adenopathy, no asymmetry, masses, or scars  RESP: lungs clear to auscultation - no rales, rhonchi or wheezes  CV: regular rate and rhythm, normal S1 S2, no S3 or S4, no murmur, click or rub, no peripheral edema  SKIN: Plaque psoriasis - scalp, hairline - right ear - back, left elbow  PSYCH: mentation appears normal, affect normal/bright          The longitudinal plan of care for the diagnosis(es)/condition(s) as documented were addressed during this visit. Due to the added complexity in  care, I will continue to support Kristina in the subsequent management and with ongoing continuity of care.           Signed Electronically by: Dary Goddard CNP

## 2024-05-06 ENCOUNTER — OFFICE VISIT (OUTPATIENT)
Dept: DERMATOLOGY | Facility: OTHER | Age: 59
End: 2024-05-06
Attending: FAMILY MEDICINE
Payer: COMMERCIAL

## 2024-05-06 VITALS — HEART RATE: 90 BPM | SYSTOLIC BLOOD PRESSURE: 142 MMHG | DIASTOLIC BLOOD PRESSURE: 82 MMHG | OXYGEN SATURATION: 97 %

## 2024-05-06 DIAGNOSIS — L40.0 PLAQUE PSORIASIS: Primary | ICD-10-CM

## 2024-05-06 PROCEDURE — 99203 OFFICE O/P NEW LOW 30 MIN: CPT | Performed by: DERMATOLOGY

## 2024-05-06 RX ORDER — CLOBETASOL PROPIONATE 0.5 MG/G
CREAM TOPICAL
Qty: 50 G | Refills: 3 | Status: SHIPPED | OUTPATIENT
Start: 2024-05-06

## 2024-05-06 ASSESSMENT — PAIN SCALES - GENERAL: PAINLEVEL: NO PAIN (0)

## 2024-05-06 NOTE — PROGRESS NOTES
S: First visit for Kristina who has  a diagnosis of psoriasis..  She had a bad flare recently and  was prescribed clobetasol solution and this was very rapidly effective for her.  She still has a bright red scaly lesion at the nape of her neck but the back and arms where she had apparently a psoriatic rash-has resolved.    She does have significant pain in the fingers and wrists and occasionally the neck and lower back.  She does have endometriosis.    O: No classic psoriasis lesions are seen on the skin but the scalp lesion is consistent with psoriasis.    A: psoriasis responsive to topical steroids and likely early psoriatic arthritis.    P: Recommend a rheumatologic referral.  In the meantime may benefit from taking OTC naproxen for joint pain.   For the skin recommended DermaZinc with clobetasol scalp solution.  Also prescribed some clobetasol cream which will probably be easier to use than a solution on her elbows and arms.  She will be getting a refill of her clobetasol solution in a few weeks as well.    She may be returning for general skin check in a few months and at that time we can see how she is doing with her skin and joint issues.    20 minutes spent in exam, discussion and charting.

## 2024-05-06 NOTE — LETTER
5/6/2024       RE: Kristina Michael  [address]  [facility] [address]     Dear Colleague,    Thank you for referring your patient, Kristina Michael, to the [facility]. Please see a copy of my visit note below.    S: First visit for Kristina who has  a diagnosis of psoriasis..  She had a bad flare recently and  was prescribed clobetasol solution and this was very rapidly effective for her.  She still has a bright red scaly lesion at the nape of her neck but the back and arms where she had apparently a psoriatic rash-has resolved.    She does have significant pain in the fingers and wrists and occasionally the neck and lower back.  She does have endometriosis.    O: No classic psoriasis lesions are seen on the skin but the scalp lesion is consistent with psoriasis.    A: psoriasis responsive to topical steroids and likely early psoriatic arthritis.    P: Recommend a rheumatologic referral.  In the meantime may benefit from taking OTC naproxen for joint pain.   For the skin recommended DermaZinc with clobetasol scalp solution.  Also prescribed some clobetasol cream which will probably be easier to use than a solution on her elbows and arms.  She will be getting a refill of her clobetasol solution in a few weeks as well.    She may be returning for general skin check in a few months and at that time we can see how she is doing with her skin and joint issues.    20 minutes spent in exam, discussion and charting.       Again, thank you for allowing me to participate in the care of your patient.      Sincerely,    LEONARDO Pacheco MD

## 2024-06-11 DIAGNOSIS — F41.9 ANXIETY: ICD-10-CM

## 2024-06-12 RX ORDER — LORAZEPAM 1 MG/1
TABLET ORAL
Qty: 90 TABLET | Refills: 1 | Status: SHIPPED | OUTPATIENT
Start: 2024-06-12 | End: 2024-08-28

## 2024-06-12 NOTE — TELEPHONE ENCOUNTER
LORAZEPAM 1MG TABLETS         Last Written Prescription Date:  4/22/24  Last Fill Quantity: 90,   # refills: 0  Last Office Visit: 4/22/24  Future Office visit:       Routing refill request to provider for review/approval because:  Drug not on the G, P or [facility] refill protocol or controlled substance

## 2024-08-27 DIAGNOSIS — F41.9 ANXIETY: ICD-10-CM

## 2024-08-27 DIAGNOSIS — L29.9 ITCHING: ICD-10-CM

## 2024-08-28 RX ORDER — HYDROXYZINE HYDROCHLORIDE 25 MG/1
TABLET, FILM COATED ORAL
Qty: 60 TABLET | Refills: 0 | Status: SHIPPED | OUTPATIENT
Start: 2024-08-28

## 2024-08-28 RX ORDER — LORAZEPAM 1 MG/1
TABLET ORAL
Qty: 90 TABLET | Refills: 0 | Status: SHIPPED | OUTPATIENT
Start: 2024-08-28

## 2024-08-28 NOTE — TELEPHONE ENCOUNTER
Hydroxyzine      Last Written Prescription Date:  4/22/24  Last Fill Quantity: 60,   # refills: 0  Last Office Visit: 4/22/24  Future Office visit:       Routing refill request to provider for review/approval because:      Ativan      Last Written Prescription Date:  6/12/24  Last Fill Quantity: 90,   # refills: 1  Last Office Visit: 4/22/24  Future Office visit:       Routing refill request to provider for review/approval because:

## 2024-10-07 DIAGNOSIS — F41.9 ANXIETY: ICD-10-CM

## 2024-10-07 DIAGNOSIS — G43.009 MIGRAINE WITHOUT AURA AND WITHOUT STATUS MIGRAINOSUS, NOT INTRACTABLE: ICD-10-CM

## 2024-10-09 RX ORDER — LORAZEPAM 1 MG/1
TABLET ORAL
Qty: 90 TABLET | Refills: 1 | Status: SHIPPED | OUTPATIENT
Start: 2024-10-09

## 2024-10-09 RX ORDER — RIZATRIPTAN BENZOATE 10 MG/1
TABLET ORAL
Qty: 18 TABLET | Refills: 5 | Status: SHIPPED | OUTPATIENT
Start: 2024-10-09

## 2024-10-09 NOTE — TELEPHONE ENCOUNTER
RIZATRIPTAN 10MG TABLETS       Last Written Prescription Date:  2/27/24  Last Fill Quantity: 18,   # refills: 5  Last Office Visit: 4/22/24  Future Office visit:       Routing refill request to provider for review/approval because:    Serotonin Agonists Ulrfcl82/07/2024 02:42 PM   Protocol Details Blood pressure under 140/90 in past 12 months    Serotonin Agonist request needs review.        BP Readings from Last 3 Encounters:   05/06/24 (!) 142/82   04/22/24 (!) 130/90   08/09/23 (!) 134/98

## 2024-11-18 DIAGNOSIS — L40.9 PSORIASIS: ICD-10-CM

## 2024-11-18 RX ORDER — CLOBETASOL PROPIONATE 0.5 MG/ML
SOLUTION TOPICAL 2 TIMES DAILY
Qty: 150 ML | Refills: 2 | Status: SHIPPED | OUTPATIENT
Start: 2024-11-18

## 2024-11-18 NOTE — TELEPHONE ENCOUNTER
CLOBETASOL PROP 0.05% LTF60DT         Last Written Prescription Date:  4/22/24  Last Fill Quantity: 150 mL,   # refills: 2  Last Office Visit: 4/22/24  Future Office visit:       Routing refill request to provider for review/approval because:  Drug not on the [facility], P or [facility] refill protocol or controlled substance

## 2024-12-20 DIAGNOSIS — L29.9 ITCHING: ICD-10-CM

## 2024-12-20 DIAGNOSIS — F41.9 ANXIETY: ICD-10-CM

## 2024-12-20 NOTE — TELEPHONE ENCOUNTER
Ativan      Last Written Prescription Date:  10/9/24  Last Fill Quantity: 90,   # refills: 1  Last Office Visit: 4/22/24  Future Office visit:       Routing refill request to provider for review/approval because:      Hydroxyzine      Last Written Prescription Date:  8/28/24  Last Fill Quantity: 60,   # refills: 0  Last Office Visit: 4/22/24  Future Office visit:       Routing refill request to provider for review/approval because:

## 2024-12-23 RX ORDER — HYDROXYZINE HYDROCHLORIDE 25 MG/1
TABLET, FILM COATED ORAL
Qty: 60 TABLET | Refills: 0 | Status: SHIPPED | OUTPATIENT
Start: 2024-12-23

## 2024-12-23 RX ORDER — LORAZEPAM 1 MG/1
TABLET ORAL
Qty: 90 TABLET | Refills: 0 | Status: SHIPPED | OUTPATIENT
Start: 2024-12-23

## 2024-12-23 NOTE — TELEPHONE ENCOUNTER
Routing refill request to provider for review/approval because:  Drug not on the [facility], [facility] or [facility] refill protocol or controlled substance

## 2025-01-30 DIAGNOSIS — F41.9 ANXIETY: ICD-10-CM

## 2025-01-30 DIAGNOSIS — L29.9 ITCHING: ICD-10-CM

## 2025-01-30 RX ORDER — HYDROXYZINE HYDROCHLORIDE 25 MG/1
TABLET, FILM COATED ORAL
Qty: 60 TABLET | Refills: 2 | Status: SHIPPED | OUTPATIENT
Start: 2025-01-30

## 2025-01-30 RX ORDER — LORAZEPAM 1 MG/1
TABLET ORAL
Qty: 90 TABLET | Refills: 0 | Status: SHIPPED | OUTPATIENT
Start: 2025-01-30

## 2025-01-30 NOTE — TELEPHONE ENCOUNTER
LORAZEPAM 1MG TABLETS         Last Written Prescription Date:  12/23/24  Last Fill Quantity: 90,   # refills: 0  Last Office Visit: 4/22/24  Future Office visit:       Routing refill request to provider for review/approval because:  Drug not on the G, P or [facility] refill protocol or controlled substance

## 2025-02-24 ENCOUNTER — ANCILLARY PROCEDURE (OUTPATIENT)
Dept: MAMMOGRAPHY | Facility: OTHER | Age: 60
End: 2025-02-24
Attending: NURSE PRACTITIONER
Payer: COMMERCIAL

## 2025-02-24 ENCOUNTER — TELEPHONE (OUTPATIENT)
Dept: MAMMOGRAPHY | Facility: OTHER | Age: 60
End: 2025-02-24

## 2025-02-24 DIAGNOSIS — Z12.31 VISIT FOR SCREENING MAMMOGRAM: ICD-10-CM

## 2025-02-24 PROCEDURE — 77067 SCR MAMMO BI INCL CAD: CPT | Mod: TC | Performed by: RADIOLOGY

## 2025-02-24 PROCEDURE — 77063 BREAST TOMOSYNTHESIS BI: CPT | Mod: TC | Performed by: RADIOLOGY

## 2025-03-12 DIAGNOSIS — F41.9 ANXIETY: ICD-10-CM

## 2025-03-12 RX ORDER — LORAZEPAM 1 MG/1
TABLET ORAL
Qty: 90 TABLET | Refills: 0 | Status: SHIPPED | OUTPATIENT
Start: 2025-03-12

## 2025-03-12 NOTE — TELEPHONE ENCOUNTER
Ativan      Last Written Prescription Date:  1/30/25  Last Fill Quantity: 90,   # refills: 0  Last Office Visit: 4/22/24  Future Office visit:       Routing refill request to provider for review/approval because:

## 2025-03-12 NOTE — TELEPHONE ENCOUNTER
Routing refill request to provider for review/approval because:  Drug not on the [facility], [facility] or [facility] refill protocol or controlled substance

## 2025-04-28 DIAGNOSIS — F41.9 ANXIETY: ICD-10-CM

## 2025-04-28 DIAGNOSIS — L40.9 PSORIASIS: ICD-10-CM

## 2025-04-28 RX ORDER — LORAZEPAM 1 MG/1
TABLET ORAL
Qty: 90 TABLET | Refills: 3 | Status: SHIPPED | OUTPATIENT
Start: 2025-04-28

## 2025-04-28 NOTE — TELEPHONE ENCOUNTER
Clobetasol      Last Written Prescription Date:  11/18/24  Last Fill Quantity: 150mL,   # refills: 2  Last Office Visit: 4/22/24  Future Office visit:       Routing refill request to provider for review/approval because:

## 2025-04-29 RX ORDER — CLOBETASOL PROPIONATE 0.5 MG/ML
SOLUTION TOPICAL 2 TIMES DAILY
Qty: 150 ML | Refills: 0 | Status: SHIPPED | OUTPATIENT
Start: 2025-04-29

## 2025-06-02 DIAGNOSIS — G43.009 MIGRAINE WITHOUT AURA AND WITHOUT STATUS MIGRAINOSUS, NOT INTRACTABLE: ICD-10-CM

## 2025-06-03 NOTE — TELEPHONE ENCOUNTER
Please schedule an Annual Wellness Visit with PCP and return to refill pool.     RIZATRIPTAN 10MG TABLETS         Last Written Prescription Date:  10/9/24  Last Fill Quantity: 18,   # refills: 5  Last Office Visit: 4/22/24  Future Office visit:       Routing refill request to provider for review/approval because:    Serotonin Agonists [id number]/02/2025 01:47 PM   Protocol Details Most recent blood pressure under 140/90 in past 12 months    Review patient's medical record. If the patient has used less than or equal to nine (9) tablets or injections for migraine a month the RN may authorize the refill request.    Recent (12 month) or future (90 days) visit with authorizing provider's specialty (provided they have been seen in the past 15 months)       BP Readings from Last 3 Encounters:   05/06/24 (!) 142/82   04/22/24 (!) 130/90   08/09/23 (!) 134/98

## 2025-06-09 RX ORDER — RIZATRIPTAN BENZOATE 10 MG/1
TABLET ORAL
Qty: 18 TABLET | Refills: 5 | Status: SHIPPED | OUTPATIENT
Start: 2025-06-09

## 2025-06-09 NOTE — TELEPHONE ENCOUNTER
"Future Appointments 6/9/2025 - 12/6/2025        Date Visit Type Length Department Provider     7/15/2025 10:30 AM PREVENTATIVE ADULT 30 min [facility] Dary Goddard CNP    [facility] Instructions:     [facility] [address]. Enter through the front entrance to register for your appointment.\"                     "

## 2025-07-10 SDOH — HEALTH STABILITY: PHYSICAL HEALTH: ON AVERAGE, HOW MANY MINUTES DO YOU ENGAGE IN EXERCISE AT THIS LEVEL?: 60 MIN

## 2025-07-10 SDOH — HEALTH STABILITY: PHYSICAL HEALTH: ON AVERAGE, HOW MANY DAYS PER WEEK DO YOU ENGAGE IN MODERATE TO STRENUOUS EXERCISE (LIKE A BRISK WALK)?: 5 DAYS

## 2025-07-10 ASSESSMENT — SOCIAL DETERMINANTS OF HEALTH (SDOH): HOW OFTEN DO YOU GET TOGETHER WITH FRIENDS OR RELATIVES?: MORE THAN THREE TIMES A WEEK

## 2025-07-14 NOTE — PATIENT INSTRUCTIONS
Assessment & Plan           Routine general medical examination at a health care facility  - CBC with platelets and differential; Future  - UA Macroscopic with reflex to Microscopic and Culture; Future      Hyperlipidemia, unspecified hyperlipidemia type  - Comprehensive metabolic panel; Future  - Lipid Profile (Chol, Trig, HDL, LDL calc); Future      Migraine without aura and without status migrainosus, not intractable  - Continue plan of care      Anxiety  - Continue Ativan as needed  - TSH with free T4 reflex; Future      Primary insomnia  - Follow-up as needed      Menopause  - DX Bone Density; Future      Tick bite, unspecified site, subsequent encounter  - LYME DISEASE TOTAL ANTIBODIES WITH REFLEX TO CONFIRMATION; Future      Hair loss  - Biotin daily  - Iron and iron binding capacity; Future      Fatigue, unspecified type  - Vitamin D level            Patient has been advised of split billing requirements and indicates understanding: Yes          All chronic conditions are stable at this time  Please continue to take all medication as directed  Please notify your pharmacy or our refill line of future refills needed.  Please return sooner than your next scheduled appointment with any concerns.        When your lab results return, we will call you with abnormal findings  You can also view this information in your MyChart, if you have an account.  Please call or Caipiaobao message us with any questions you may have.            Dary PARNELL-JACKY  [phone]       Preventive Care Advice   This is general advice given by our system to help you stay healthy. However, your care team may have specific advice just for you. Please talk to your care team about your preventive care needs.  Nutrition  Eat 5 or more servings of fruits and vegetables each day.  Try wheat bread, brown rice and whole grain pasta (instead of white bread, rice, and pasta).  Get enough calcium and vitamin D. Check the label on foods and aim for  100% of the RDA (recommended daily allowance).  Lifestyle  Exercise at least 150 minutes each week  (30 minutes a day, 5 days a week).  Do muscle strengthening activities 2 days a week. These help control your weight and prevent disease.  No smoking.  Wear sunscreen to prevent skin cancer.  Have a dental exam and cleaning every 6 months.  Yearly exams  See your health care team every year to talk about:  Any changes in your health.  Any medicines your care team has prescribed.  Preventive care, family planning, and ways to prevent chronic diseases.  Shots (vaccines)   HPV shots (up to age 26), if you've never had them before.  Hepatitis B shots (up to age 59), if you've never had them before.  COVID-19 shot: Get this shot when it's due.  Flu shot: Get a flu shot every year.  Tetanus shot: Get a tetanus shot every 10 years.  Pneumococcal, hepatitis A, and RSV shots: Ask your care team if you need these based on your risk.  Shingles shot (for age 50 and up)  General health tests  Diabetes screening:  Starting at age 35, Get screened for diabetes at least every 3 years.  If you are younger than age 35, ask your care team if you should be screened for diabetes.  Cholesterol test: At age 39, start having a cholesterol test every 5 years, or more often if advised.  Bone density scan (DEXA): At age 50, ask your care team if you should have this scan for osteoporosis (brittle bones).  Hepatitis C: Get tested at least once in your life.  STIs (sexually transmitted infections)  Before age 24: Ask your care team if you should be screened for STIs.  After age 24: Get screened for STIs if you're at risk. You are at risk for STIs (including HIV) if:  You are sexually active with more than one person.  You don't use condoms every time.  You or a partner was diagnosed with a sexually transmitted infection.  If you are at risk for HIV, ask about PrEP medicine to prevent HIV.  Get tested for HIV at least once in your life, whether  you are at risk for HIV or not.  Cancer screening tests  Cervical cancer screening: If you have a cervix, begin getting regular cervical cancer screening tests starting at age 21.  Breast cancer scan (mammogram): If you've ever had breasts, begin having regular mammograms starting at age 40. This is a scan to check for breast cancer.  Colon cancer screening: It is important to start screening for colon cancer at age 45.  Have a colonoscopy test every 10 years (or more often if you're at risk) Or, ask your provider about stool tests like a FIT test every year or Cologuard test every 3 years.  To learn more about your testing options, visit:   .  For help making a decision, visit:   https://bit.ly/jl68345.  Prostate cancer screening test: If you have a prostate, ask your care team if a prostate cancer screening test (PSA) at age 55 is right for you.  Lung cancer screening: If you are a current or former smoker ages 50 to 80, ask your care team if ongoing lung cancer screenings are right for you.  For informational purposes only. Not to replace the advice of your health care provider. Copyright   2023 [employer]. All rights reserved. Clinically reviewed by the [facility] Transitions Program. [employer] [id number] - REV 01/24.

## 2025-07-15 ENCOUNTER — OFFICE VISIT (OUTPATIENT)
Dept: FAMILY MEDICINE | Facility: OTHER | Age: 60
End: 2025-07-15
Attending: NURSE PRACTITIONER
Payer: COMMERCIAL

## 2025-07-15 VITALS
SYSTOLIC BLOOD PRESSURE: 139 MMHG | WEIGHT: 154 LBS | TEMPERATURE: 98 F | HEIGHT: 67 IN | HEART RATE: 90 BPM | BODY MASS INDEX: 24.17 KG/M2 | OXYGEN SATURATION: 95 % | DIASTOLIC BLOOD PRESSURE: 87 MMHG | RESPIRATION RATE: 18 BRPM
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
## 2025-07-15 DIAGNOSIS — E78.5 HYPERLIPIDEMIA, UNSPECIFIED HYPERLIPIDEMIA TYPE: ICD-10-CM

## 2025-07-15 DIAGNOSIS — G43.009 MIGRAINE WITHOUT AURA AND WITHOUT STATUS MIGRAINOSUS, NOT INTRACTABLE: ICD-10-CM

## 2025-07-15 DIAGNOSIS — Z00.00 ROUTINE GENERAL MEDICAL EXAMINATION AT A HEALTH CARE FACILITY: Primary | ICD-10-CM

## 2025-07-15 DIAGNOSIS — F51.01 PRIMARY INSOMNIA: ICD-10-CM

## 2025-07-15 DIAGNOSIS — L65.9 HAIR LOSS: ICD-10-CM

## 2025-07-15 DIAGNOSIS — Z78.0 MENOPAUSE: ICD-10-CM

## 2025-07-15 DIAGNOSIS — R53.83 FATIGUE, UNSPECIFIED TYPE: ICD-10-CM

## 2025-07-15 DIAGNOSIS — F41.9 ANXIETY: ICD-10-CM

## 2025-07-15 LAB
ALBUMIN SERPL BCG-MCNC: 4.5 G/DL (ref 3.5–5.2)
ALBUMIN UR-MCNC: NEGATIVE MG/DL
ALP SERPL-CCNC: 69 U/L (ref 40–150)
ALT SERPL W P-5'-P-CCNC: 17 U/L (ref 0–50)
ANION GAP SERPL CALCULATED.3IONS-SCNC: 11 MMOL/L (ref 7–15)
APPEARANCE UR: CLEAR
AST SERPL W P-5'-P-CCNC: 31 U/L (ref 0–45)
BACTERIA #/AREA URNS HPF: ABNORMAL /HPF
BASOPHILS # BLD AUTO: 0.1 10E3/UL (ref 0–0.2)
BASOPHILS NFR BLD AUTO: 1 %
BILIRUB SERPL-MCNC: 0.7 MG/DL
BILIRUB UR QL STRIP: NEGATIVE
BUN SERPL-MCNC: 16.4 MG/DL (ref 6–20)
CALCIUM SERPL-MCNC: 9.7 MG/DL (ref 8.8–10.4)
CHLORIDE SERPL-SCNC: 102 MMOL/L (ref 98–107)
CHOLEST SERPL-MCNC: 271 MG/DL
COLOR UR AUTO: YELLOW
CREAT SERPL-MCNC: 0.91 MG/DL (ref 0.51–0.95)
EGFRCR SERPLBLD CKD-EPI 2021: 73 ML/MIN/1.73M2
EOSINOPHIL # BLD AUTO: 0.2 10E3/UL (ref 0–0.7)
EOSINOPHIL NFR BLD AUTO: 3 %
ERYTHROCYTE [DISTWIDTH] IN BLOOD BY AUTOMATED COUNT: 12.6 % (ref 10–15)
FASTING STATUS PATIENT QL REPORTED: YES
FASTING STATUS PATIENT QL REPORTED: YES
GLUCOSE SERPL-MCNC: 100 MG/DL (ref 70–99)
GLUCOSE UR STRIP-MCNC: NEGATIVE MG/DL
HCO3 SERPL-SCNC: 24 MMOL/L (ref 22–29)
HCT VFR BLD AUTO: 41.9 % (ref 35–47)
HDLC SERPL-MCNC: 81 MG/DL
HGB BLD-MCNC: 14.6 G/DL (ref 11.7–15.7)
HGB UR QL STRIP: NEGATIVE
IMM GRANULOCYTES # BLD: 0 10E3/UL
IMM GRANULOCYTES NFR BLD: 0 %
IRON BINDING CAPACITY (ROCHE): 324 UG/DL (ref 240–430)
IRON SATN MFR SERPL: 47 % (ref 15–46)
IRON SERPL-MCNC: 153 UG/DL (ref 37–145)
KETONES UR STRIP-MCNC: NEGATIVE MG/DL
LDLC SERPL CALC-MCNC: 172 MG/DL
LEUKOCYTE ESTERASE UR QL STRIP: ABNORMAL
LYMPHOCYTES # BLD AUTO: 2.3 10E3/UL (ref 0.8–5.3)
LYMPHOCYTES NFR BLD AUTO: 39 %
MCH RBC QN AUTO: 29.9 PG (ref 26.5–33)
MCHC RBC AUTO-ENTMCNC: 34.8 G/DL (ref 31.5–36.5)
MCV RBC AUTO: 86 FL (ref 78–100)
MONOCYTES # BLD AUTO: 0.8 10E3/UL (ref 0–1.3)
MONOCYTES NFR BLD AUTO: 14 %
NEUTROPHILS # BLD AUTO: 2.6 10E3/UL (ref 1.6–8.3)
NEUTROPHILS NFR BLD AUTO: 44 %
NITRATE UR QL: NEGATIVE
NONHDLC SERPL-MCNC: 190 MG/DL
PH UR STRIP: 7.5 [PH] (ref 5–7)
PLATELET # BLD AUTO: 370 10E3/UL (ref 150–450)
POTASSIUM SERPL-SCNC: 4.5 MMOL/L (ref 3.4–5.3)
PROT SERPL-MCNC: 7.1 G/DL (ref 6.4–8.3)
RBC # BLD AUTO: 4.88 10E6/UL (ref 3.8–5.2)
RBC #/AREA URNS AUTO: ABNORMAL /HPF
SODIUM SERPL-SCNC: 137 MMOL/L (ref 135–145)
SP GR UR STRIP: 1.01 (ref 1–1.03)
SQUAMOUS #/AREA URNS AUTO: ABNORMAL /LPF
T4 FREE SERPL-MCNC: 0.94 NG/DL (ref 0.9–1.7)
TRIGL SERPL-MCNC: 91 MG/DL
TSH SERPL DL<=0.005 MIU/L-ACNC: 6.3 UIU/ML (ref 0.3–4.2)
UROBILINOGEN UR STRIP-ACNC: 0.2 E.U./DL
WBC # BLD AUTO: 5.9 10E3/UL (ref 4–11)
WBC #/AREA URNS AUTO: ABNORMAL /HPF

## 2025-07-15 ASSESSMENT — ANXIETY QUESTIONNAIRES
5. BEING SO RESTLESS THAT IT IS HARD TO SIT STILL: NOT AT ALL
2. NOT BEING ABLE TO STOP OR CONTROL WORRYING: SEVERAL DAYS
6. BECOMING EASILY ANNOYED OR IRRITABLE: NOT AT ALL
GAD7 TOTAL SCORE: 4
GAD7 TOTAL SCORE: 4
3. WORRYING TOO MUCH ABOUT DIFFERENT THINGS: SEVERAL DAYS
1. FEELING NERVOUS, ANXIOUS, OR ON EDGE: SEVERAL DAYS
4. TROUBLE RELAXING: NOT AT ALL
GAD7 TOTAL SCORE: 4
8. IF YOU CHECKED OFF ANY PROBLEMS, HOW DIFFICULT HAVE THESE MADE IT FOR YOU TO DO YOUR WORK, TAKE CARE OF THINGS AT HOME, OR GET ALONG WITH OTHER PEOPLE?: NOT DIFFICULT AT ALL
7. FEELING AFRAID AS IF SOMETHING AWFUL MIGHT HAPPEN: SEVERAL DAYS
IF YOU CHECKED OFF ANY PROBLEMS ON THIS QUESTIONNAIRE, HOW DIFFICULT HAVE THESE PROBLEMS MADE IT FOR YOU TO DO YOUR WORK, TAKE CARE OF THINGS AT HOME, OR GET ALONG WITH OTHER PEOPLE: NOT DIFFICULT AT ALL
7. FEELING AFRAID AS IF SOMETHING AWFUL MIGHT HAPPEN: SEVERAL DAYS

## 2025-07-15 ASSESSMENT — PATIENT HEALTH QUESTIONNAIRE - PHQ9
10. IF YOU CHECKED OFF ANY PROBLEMS, HOW DIFFICULT HAVE THESE PROBLEMS MADE IT FOR YOU TO DO YOUR WORK, TAKE CARE OF THINGS AT HOME, OR GET ALONG WITH OTHER PEOPLE: NOT DIFFICULT AT ALL
SUM OF ALL RESPONSES TO PHQ QUESTIONS 1-9: 0
SUM OF ALL RESPONSES TO PHQ QUESTIONS 1-9: 0

## 2025-07-15 ASSESSMENT — PAIN SCALES - GENERAL: PAINLEVEL_OUTOF10: NO PAIN (0)

## 2025-07-15 NOTE — PROGRESS NOTES
Preventive Care Visit  [facility]  Dary Goddard CNP, Family Medicine        Jul 15, 2025            Assessment & Plan         Routine general medical examination at a health care facility  - CBC with platelets and differential; Future  - UA Macroscopic with reflex to Microscopic and Culture; Future      Hyperlipidemia, unspecified hyperlipidemia type  - Comprehensive metabolic panel; Future  - Lipid Profile (Chol, Trig, HDL, LDL calc); Future      Migraine without aura and without status migrainosus, not intractable  - Continue plan of care      Anxiety  - Continue Ativan as needed  - TSH with free T4 reflex; Future      Primary insomnia  - Follow-up as needed      Menopause  - DX Bone Density; Future      Tick bite, unspecified site, subsequent encounter  - LYME DISEASE TOTAL ANTIBODIES WITH REFLEX TO CONFIRMATION; Future      Hair loss  - Biotin daily  - Iron and iron binding capacity; Future      Fatigue, unspecified type  - Vitamin D level            Patient has been advised of split billing requirements and indicates understanding: Yes          All chronic conditions are stable at this time  Please continue to take all medication as directed  Please notify your pharmacy or our refill line of future refills needed.  Please return sooner than your next scheduled appointment with any concerns.        When your lab results return, we will call you with abnormal findings  You can also view this information in your MyChart, if you have an account.  Please call or [employer] message us with any questions you may have.            Dary Goddard [facility]  [phone]               Kristina is a 59 year old, presenting for the following:  Physical          Hyperlipidemia Follow-Up  Are you regularly taking any medication or supplement to lower your cholesterol?   No  Are you having muscle aches or other side effects that you think could be caused by your cholesterol lowering medication?  No          Anxiety   How are you doing  with your anxiety since your last visit? No change  Are you having other symptoms that might be associated with anxiety? No  Have you had a significant life event? No   Are you feeling depressed? No  Do you have any concerns with your use of alcohol or other drugs? No          Social History     Tobacco Use    Smoking status: Never    Smokeless tobacco: Never   Vaping Use    Vaping status: Never Used   Substance Use Topics    Alcohol use: Yes    Drug use: No               8/9/2023    11:34 AM 4/22/2024    11:01 AM 7/15/2025    10:11 AM   SARAVANAN-7 SCORE   Total Score  6 (mild anxiety) 4 (minimal anxiety)   Total Score 0 6 4        Patient-reported               1/19/2022    10:10 AM 12/13/2022    10:00 AM 7/15/2025    10:10 AM   PHQ   PHQ-9 Total Score 0 3 0    Q9: Thoughts of better off dead/self-harm past 2 weeks Not at all Not at all Not at all       Patient-reported               7/15/2025    10:10 AM   Last PHQ-9   1.  Little interest or pleasure in doing things 0   2.  Feeling down, depressed, or hopeless 0   3.  Trouble falling or staying asleep, or sleeping too much 0   4.  Feeling tired or having little energy 0   5.  Poor appetite or overeating 0   6.  Feeling bad about yourself 0   7.  Trouble concentrating 0   8.  Moving slowly or restless 0   Q9: Thoughts of better off dead/self-harm past 2 weeks 0   PHQ-9 Total Score 0        Patient-reported               7/15/2025    10:11 AM   SARAVANAN-7    1. Feeling nervous, anxious, or on edge 1   2. Not being able to stop or control worrying 1   3. Worrying too much about different things 1   4. Trouble relaxing 0   5. Being so restless that it is hard to sit still 0   6. Becoming easily annoyed or irritable 0   7. Feeling afraid, as if something awful might happen 1   SARAVANAN-7 Total Score 4    If you checked any problems, how difficult have they made it for you to do your work, take care of things at home, or get along with other people? Not difficult at all        Patient-reported           Migraine   Since your last clinic visit, how have your headaches changed?  Improved  How often are you getting headaches or migraines? Weekly    Are you able to do normal daily activities when you have a migraine? No  Are you taking rescue/relief medications? (Select all that apply) Maxalt  How helpful is your rescue/relief medication?  I get total relief  Are you taking any medications to prevent migraines? (Select all that apply)  No  In the past 4 weeks, how often have you gone to urgent care or the emergency room because of your headaches?  0          Patient Active Problem List   Diagnosis    Primary insomnia    Surgical menopause    H/O cold sores    Hyperlipidemia    Migraine without aura    Anxiety    Pelvic pain     Past Surgical History:   Procedure Laterality Date    ABDOMEN SURGERY      COLONOSCOPY      DILATION AND CURETTAGE      x 2    GYN SURGERY      endometerial tiissue removed x's 2    HC TOOTH EXTRACTION W/FORCEP      HYSTERECTOMY TOTAL ABDOMINAL N/A 01/01/2000    HYSTEROSCOPY      MAMMOPLASTY REDUCTION BILATERAL      breast reduction       Social History     Tobacco Use    Smoking status: Never    Smokeless tobacco: Never   Substance Use Topics    Alcohol use: Yes     Family History   Problem Relation Age of Onset    Cancer - colorectal Paternal Grandfather     Alzheimer Disease Paternal Grandfather              Current Outpatient Medications   Medication Sig Dispense Refill    clobetasol (TEMOVATE) 0.05 % external solution APPLY TOPICALLY TO THE AFFECTED AREA TWICE DAILY 150 mL 0    COMPOUND CONTAINING CONTROLLED SUBSTANCE (CMPD RX) - PHARMACY TO MIX COMPOUNDED MEDICATION BIEST 80:20/PROGESTERONE/TESTOSTERONE 0.75/40/0.25MG ALVIN 90 each 0    hydrOXYzine HCl (ATARAX) 25 MG tablet TAKE 1 TABLET(25 MG) BY MOUTH THREE TIMES DAILY AS NEEDED FOR ITCHING 60 tablet 2    LORazepam (ATIVAN) 1 MG tablet TAKE 1/2 TO 1 TABLET BY MOUTH EVERY 8 HOURS AS NEEDED 90 tablet 3     multivitamin, therapeutic (THERA-VIT) TABS Take 1 tablet by mouth daily      rizatriptan (MAXALT) 10 MG tablet TAKE 1 TABLET BY MOUTH AT ONSET OF HEADACHE FOR MIGRAINE 18 tablet 5           Allergies   Allergen Reactions    Penicillins Hives         Recent Labs   Lab Test 12/13/22  1043 01/24/22  1043 03/26/21  1039 02/20/19  0825   *  --  151* 134*   HDL 68  --  64 73   TRIG 117  --  130 111   ALT 48* 53* 63* 26   CR 1.04* 1.02 0.96 0.93   GFRESTIMATED 64 65 68 71   GFRESTBLACK  --   --  78 82   POTASSIUM 3.7 3.7 3.8 4.1   TSH 2.98  --  2.09 2.80          BP Readings from Last 3 Encounters:   07/15/25 139/87   05/06/24 (!) 142/82   04/22/24 (!) 130/90    Wt Readings from Last 3 Encounters:   07/15/25 69.9 kg (154 lb)   04/22/24 75.8 kg (167 lb)   12/13/22 79 kg (174 lb 3.2 oz)                   Advance Care Planning  Discussed advance care planning with patient; however, patient declined at this time.            7/10/2025   General Health   How would you rate your overall physical health? Good   Feel stress (tense, anxious, or unable to sleep) Only a little   (!) STRESS CONCERN              7/10/2025   Nutrition   Three or more servings of calcium each day? Yes   Diet: Regular (no restrictions)   How many servings of fruit and vegetables per day? (!) 2-3   How many sweetened beverages each day? 0-1             7/10/2025   Exercise   Days per week of moderate/strenous exercise 5 days   Average minutes spent exercising at this level 60 min             7/10/2025   Social Factors   Frequency of gathering with friends or relatives More than three times a week   Worry food won't last until get money to buy more Yes   Food not last or not have enough money for food? No   Do you have housing? (Housing is defined as stable permanent housing and does not include staying outside in a car, in a tent, in an abandoned building, in an overnight shelter, or couch-surfing.) Yes   Are you worried about losing your housing? No    Lack of transportation? No   Unable to get utilities (heat,electricity)? No   (!) FOOD SECURITY CONCERN PRESENT              7/10/2025   Fall Risk   Fallen 2 or more times in the past year? No   Trouble with walking or balance? No                7/10/2025   Dental   Dentist two times every year? Yes           Today's PHQ-9 Score:       7/15/2025    10:10 AM   PHQ-9 SCORE   PHQ-9 Total Score MyChart 0   PHQ-9 Total Score 0        Patient-reported               7/10/2025   Substance Use   Alcohol more than 3/day or more than 7/wk No   Do you use any other substances recreationally? No    (!) PRESCRIPTION DRUGS       Multiple values from one day are sorted in reverse-chronological order         Social History     Tobacco Use    Smoking status: Never    Smokeless tobacco: Never   Vaping Use    Vaping status: Never Used   Substance Use Topics    Alcohol use: Yes    Drug use: No             2/24/2025   LAST FHS-7 RESULTS   1st degree relative breast or ovarian cancer No   Any relative bilateral breast cancer No   Any male have breast cancer No   Any ONE woman have BOTH breast AND ovarian cancer No   Any woman with breast cancer before 50yrs No   2 or more relatives with breast AND/OR ovarian cancer No   2 or more relatives with breast AND/OR bowel cancer No         Mammogram is UTD            7/10/2025   STI Screening   New sexual partner(s) since last STI/HIV test? No           History of abnormal Pap smear: S/P hysterectomy           ASCVD Risk   The 10-year ASCVD risk score (Cat VICKERS, et al., 2019) is: 2.9%    Values used to calculate the score:      Age: 59 years      Sex: Female      Is Non- : No      Diabetic: No      Tobacco smoker: No      Systolic Blood Pressure: 139 mmHg      Is BP treated: No      HDL Cholesterol: 68 mg/dL      Total Cholesterol: 243 mg/dL                        Past Medical History:   Diagnosis Date    Anxiety 02/19/2019    H/O cold sores 10/14/2016     Headache(784.0) 05/12/2015    Hyperlipidemia LDL goal <100 10/14/2016    Insomnia 05/12/2015    Migraine without aura and without status migrainosus, not intractable 02/23/2018    Need for prophylactic hormone replacement therapy (postmenopausal)     Primary insomnia 05/12/2015    Surgical menopause 05/12/2015           Past Surgical History:   Procedure Laterality Date    ABDOMEN SURGERY      COLONOSCOPY      DILATION AND CURETTAGE      x 2    GYN SURGERY      endometerial tiissue removed x's 2    HC TOOTH EXTRACTION W/FORCEP      HYSTERECTOMY TOTAL ABDOMINAL N/A 01/01/2000    HYSTEROSCOPY      MAMMOPLASTY REDUCTION BILATERAL      breast reduction           OB History   No obstetric history on file.         Lab work is in process  Labs reviewed in EPIC  BP Readings from Last 3 Encounters:   07/15/25 139/87   05/06/24 (!) 142/82   04/22/24 (!) 130/90    Wt Readings from Last 3 Encounters:   07/15/25 69.9 kg (154 lb)   04/22/24 75.8 kg (167 lb)   12/13/22 79 kg (174 lb 3.2 oz)                  Patient Active Problem List   Diagnosis    Primary insomnia    Surgical menopause    H/O cold sores    Hyperlipidemia    Migraine without aura    Anxiety    Pelvic pain     Past Surgical History:   Procedure Laterality Date    ABDOMEN SURGERY      COLONOSCOPY      DILATION AND CURETTAGE      x 2    GYN SURGERY      endometerial tiissue removed x's 2    HC TOOTH EXTRACTION W/FORCEP      HYSTERECTOMY TOTAL ABDOMINAL N/A 01/01/2000    HYSTEROSCOPY      MAMMOPLASTY REDUCTION BILATERAL      breast reduction       Social History     Tobacco Use    Smoking status: Never    Smokeless tobacco: Never   Substance Use Topics    Alcohol use: Yes     Family History   Problem Relation Age of Onset    Cancer - colorectal Paternal Grandfather     Alzheimer Disease Paternal Grandfather                Current Outpatient Medications   Medication Sig Dispense Refill    clobetasol (TEMOVATE) 0.05 % external solution APPLY TOPICALLY TO THE AFFECTED  "AREA TWICE DAILY 150 mL 0    COMPOUND CONTAINING CONTROLLED SUBSTANCE (CMPD RX) - PHARMACY TO MIX COMPOUNDED MEDICATION BIEST 80:20/PROGESTERONE/TESTOSTERONE 0.75/40/0.25MG ALVIN 90 each 0    hydrOXYzine HCl (ATARAX) 25 MG tablet TAKE 1 TABLET(25 MG) BY MOUTH THREE TIMES DAILY AS NEEDED FOR ITCHING 60 tablet 2    LORazepam (ATIVAN) 1 MG tablet TAKE 1/2 TO 1 TABLET BY MOUTH EVERY 8 HOURS AS NEEDED 90 tablet 3    multivitamin, therapeutic (THERA-VIT) TABS Take 1 tablet by mouth daily      rizatriptan (MAXALT) 10 MG tablet TAKE 1 TABLET BY MOUTH AT ONSET OF HEADACHE FOR MIGRAINE 18 tablet 5             Allergies   Allergen Reactions    Penicillins Hives           Recent Labs   Lab Test 12/13/22  1043 01/24/22  1043 03/26/21  1039 02/20/19  0825   *  --  151* 134*   HDL 68  --  64 73   TRIG 117  --  130 111   ALT 48* 53* 63* 26   CR 1.04* 1.02 0.96 0.93   GFRESTIMATED 64 65 68 71   GFRESTBLACK  --   --  78 82   POTASSIUM 3.7 3.7 3.8 4.1   TSH 2.98  --  2.09 2.80             Review of Systems  Constitutional, HEENT, cardiovascular, pulmonary, GI, , musculoskeletal, neuro, skin, endocrine and psych systems are negative, except as otherwise noted.         Objective    Exam  /87 (BP Location: Right arm, Patient Position: Sitting, Cuff Size: Adult Regular)   Pulse 90   Temp 98  F (36.7  C) (Tympanic)   Resp 18   Ht 1.702 m (5' 7\")   Wt 69.9 kg (154 lb)   SpO2 95%   Breastfeeding No   BMI 24.12 kg/m     Estimated body mass index is 24.12 kg/m  as calculated from the following:    Height as of this encounter: 1.702 m (5' 7\").    Weight as of this encounter: 69.9 kg (154 lb).          Physical Exam  GENERAL: alert and no distress  EYES: Eyes grossly normal to inspection, PERRL and conjunctivae and sclerae normal  HENT: ear canals and TM's normal, nose and mouth without ulcers or lesions  NECK: no adenopathy, no asymmetry, masses, or scars  RESP: lungs clear to auscultation - no rales, rhonchi or " wheezes  CV: regular rate and rhythm, normal S1 S2, no S3 or S4, no murmur, click or rub, no peripheral edema  ABDOMEN: soft, nontender, no hepatosplenomegaly, no masses and bowel sounds normal  MS: no gross musculoskeletal defects noted, no edema  SKIN: no suspicious lesions or rashes  PSYCH: mentation appears normal, affect normal/bright          The longitudinal plan of care for the diagnosis(es)/condition(s) as documented were addressed during this visit. Due to the added complexity in care, I will continue to support Kristina in the subsequent management and with ongoing continuity of care.           Signed Electronically by: Dary Goddard CNP

## 2025-07-16 LAB — VIT D+METAB SERPL-MCNC: 66 NG/ML (ref 20–50)

## 2025-07-17 LAB — B BURGDOR IGG+IGM SER QL: 0.07

## 2025-08-06 ENCOUNTER — HOSPITAL ENCOUNTER (OUTPATIENT)
Dept: BONE DENSITY | Facility: HOSPITAL | Age: 60
Discharge: HOME | End: 2025-08-06
Attending: NURSE PRACTITIONER
Payer: COMMERCIAL

## 2025-08-06 DIAGNOSIS — Z78.0 MENOPAUSE: ICD-10-CM

## 2025-08-06 PROCEDURE — 77080 DXA BONE DENSITY AXIAL: CPT

## 2025-08-06 PROCEDURE — 77080 DXA BONE DENSITY AXIAL: CPT | Mod: 26 | Performed by: RADIOLOGY

## 2025-08-12 DIAGNOSIS — N91.2 ABSENCE OF MENSTRUATION: ICD-10-CM
